# Patient Record
Sex: FEMALE | URBAN - METROPOLITAN AREA
[De-identification: names, ages, dates, MRNs, and addresses within clinical notes are randomized per-mention and may not be internally consistent; named-entity substitution may affect disease eponyms.]

---

## 2021-01-22 ENCOUNTER — HISTORICAL (OUTPATIENT)
Dept: ADMINISTRATIVE | Facility: HOSPITAL | Age: 66
End: 2021-01-22

## 2021-01-22 LAB — VALPROATE SERPL-MCNC: 57.4 UG/ML (ref 50–100)

## 2021-02-09 ENCOUNTER — HISTORICAL (OUTPATIENT)
Dept: ADMINISTRATIVE | Facility: HOSPITAL | Age: 66
End: 2021-02-09

## 2021-02-09 LAB
ABS NEUT (OLG): 3.84 X10(3)/MCL (ref 2.1–9.2)
ALBUMIN SERPL-MCNC: 3 GM/DL (ref 3.4–4.8)
ALBUMIN/GLOB SERPL: 0.8 RATIO (ref 1.1–2)
ALP SERPL-CCNC: 62 UNIT/L (ref 40–150)
ALT SERPL-CCNC: 7 UNIT/L (ref 0–55)
AST SERPL-CCNC: 11 UNIT/L (ref 5–34)
BASOPHILS # BLD AUTO: 0.02 X10(3)/MCL (ref 0–0.2)
BASOPHILS NFR BLD AUTO: 0.3 % (ref 0–1)
BILIRUB SERPL-MCNC: 0.3 MG/DL (ref 0.2–1.2)
BILIRUBIN DIRECT+TOT PNL SERPL-MCNC: 0.1 MG/DL (ref 0–0.8)
BILIRUBIN DIRECT+TOT PNL SERPL-MCNC: 0.2 MG/DL (ref 0–0.5)
BUN SERPL-MCNC: 25.7 MG/DL (ref 9.8–20.1)
CALCIUM SERPL-MCNC: 9.3 MG/DL (ref 8.4–10.2)
CHLORIDE SERPL-SCNC: 104 MMOL/L (ref 98–107)
CO2 SERPL-SCNC: 26 MMOL/L (ref 23–31)
CREAT SERPL-MCNC: 0.72 MG/DL (ref 0.57–1.11)
EOSINOPHIL # BLD AUTO: 0.28 X10(3)/MCL (ref 0–0.9)
EOSINOPHIL NFR BLD AUTO: 4.1 % (ref 0–6.4)
ERYTHROCYTE [DISTWIDTH] IN BLOOD BY AUTOMATED COUNT: 14.1 % (ref 11.5–17)
GLOBULIN SER-MCNC: 3.7 GM/DL (ref 2.4–3.5)
GLUCOSE SERPL-MCNC: 136 MG/DL (ref 82–115)
HCT VFR BLD AUTO: 42.6 % (ref 37–47)
HGB BLD-MCNC: 13.3 GM/DL (ref 12–16)
IMM GRANULOCYTES # BLD AUTO: 0.02 10*3/UL (ref 0–0.02)
IMM GRANULOCYTES NFR BLD AUTO: 0.3 % (ref 0–0.43)
LYMPHOCYTES # BLD AUTO: 2.24 X10(3)/MCL (ref 0.6–4.6)
LYMPHOCYTES NFR BLD AUTO: 33.1 % (ref 16–44)
MCH RBC QN AUTO: 26.5 PG (ref 27–31)
MCHC RBC AUTO-ENTMCNC: 31.2 GM/DL (ref 33–36)
MCV RBC AUTO: 84.9 FL (ref 80–94)
MONOCYTES # BLD AUTO: 0.36 X10(3)/MCL (ref 0.1–1.3)
MONOCYTES NFR BLD AUTO: 5.3 % (ref 4–12.1)
NEUTROPHILS # BLD AUTO: 3.84 X10(3)/MCL (ref 2.1–9.2)
NEUTROPHILS NFR BLD AUTO: 56.9 % (ref 43–73)
NRBC BLD AUTO-RTO: 0 % (ref 0–0.2)
PLATELET # BLD AUTO: 206 X10(3)/MCL (ref 130–400)
PMV BLD AUTO: 10.8 FL (ref 7.4–10.4)
POTASSIUM SERPL-SCNC: 3.8 MMOL/L (ref 3.5–5.1)
PROT SERPL-MCNC: 6.7 GM/DL (ref 5.8–7.6)
RBC # BLD AUTO: 5.02 X10(6)/MCL (ref 4.2–5.4)
SODIUM SERPL-SCNC: 142 MMOL/L (ref 136–145)
WBC # SPEC AUTO: 6.8 X10(3)/MCL (ref 4.5–11.5)

## 2021-03-22 ENCOUNTER — HISTORICAL (OUTPATIENT)
Dept: ADMINISTRATIVE | Facility: HOSPITAL | Age: 66
End: 2021-03-22

## 2021-03-22 LAB
ALBUMIN SERPL-MCNC: 3 GM/DL (ref 3.4–4.8)
ALBUMIN/GLOB SERPL: 1 RATIO (ref 1.1–2)
ALP SERPL-CCNC: 60 UNIT/L (ref 40–150)
ALT SERPL-CCNC: 7 UNIT/L (ref 0–55)
AST SERPL-CCNC: 8 UNIT/L (ref 5–34)
BILIRUB SERPL-MCNC: 0.2 MG/DL (ref 0.2–1.2)
BILIRUBIN DIRECT+TOT PNL SERPL-MCNC: 0.1 MG/DL (ref 0–0.5)
BILIRUBIN DIRECT+TOT PNL SERPL-MCNC: 0.1 MG/DL (ref 0–0.8)
BUN SERPL-MCNC: 33.3 MG/DL (ref 9.8–20.1)
CALCIUM SERPL-MCNC: 8.8 MG/DL (ref 8.4–10.2)
CHLORIDE SERPL-SCNC: 104 MMOL/L (ref 98–107)
CO2 SERPL-SCNC: 28 MMOL/L (ref 23–31)
CREAT SERPL-MCNC: 0.97 MG/DL (ref 0.57–1.11)
GLOBULIN SER-MCNC: 3.1 GM/DL (ref 2.4–3.5)
GLUCOSE SERPL-MCNC: 83 MG/DL (ref 82–115)
POTASSIUM SERPL-SCNC: 4.8 MMOL/L (ref 3.5–5.1)
PROT SERPL-MCNC: 6.1 GM/DL (ref 5.8–7.6)
SODIUM SERPL-SCNC: 141 MMOL/L (ref 136–145)

## 2021-05-28 ENCOUNTER — HISTORICAL (OUTPATIENT)
Dept: ADMINISTRATIVE | Facility: HOSPITAL | Age: 66
End: 2021-05-28

## 2021-05-28 LAB
APPEARANCE, UA: ABNORMAL
BACTERIA SPEC CULT: ABNORMAL /HPF
BILIRUB UR QL STRIP: NEGATIVE
COLOR UR: ABNORMAL
GLUCOSE (UA): NEGATIVE
HGB UR QL STRIP: ABNORMAL
KETONES UR QL STRIP: NEGATIVE
LEUKOCYTE ESTERASE UR QL STRIP: ABNORMAL
MUCOUS THREADS URNS QL MICRO: ABNORMAL /LPF
NITRITE UR QL STRIP: POSITIVE
PH UR STRIP: 6 [PH] (ref 5–7)
PROT UR QL STRIP: NEGATIVE
RBC #/AREA URNS HPF: 0 /[HPF]
SP GR UR STRIP: 1.01 (ref 1–1.03)
SQUAMOUS EPITHELIAL, UA: ABNORMAL /LPF
UROBILINOGEN UR STRIP-ACNC: NEGATIVE
WBC #/AREA URNS HPF: ABNORMAL /HPF

## 2021-06-03 ENCOUNTER — HISTORICAL (OUTPATIENT)
Dept: ADMINISTRATIVE | Facility: HOSPITAL | Age: 66
End: 2021-06-03

## 2021-06-03 LAB
ABS NEUT (OLG): 2.56 X10(3)/MCL (ref 2.1–9.2)
ALBUMIN SERPL-MCNC: 2.8 GM/DL (ref 3.4–4.8)
ALBUMIN/GLOB SERPL: 0.8 RATIO (ref 1.1–2)
ALP SERPL-CCNC: 54 UNIT/L (ref 40–150)
ALT SERPL-CCNC: 8 UNIT/L (ref 0–55)
AST SERPL-CCNC: 10 UNIT/L (ref 5–34)
BASOPHILS # BLD AUTO: 0.03 X10(3)/MCL (ref 0–0.2)
BASOPHILS NFR BLD AUTO: 0.5 % (ref 0–1)
BILIRUB SERPL-MCNC: 0.2 MG/DL (ref 0.2–1.2)
BILIRUBIN DIRECT+TOT PNL SERPL-MCNC: <0.1 MG/DL (ref 0–0.5)
BILIRUBIN DIRECT+TOT PNL SERPL-MCNC: >0.1 MG/DL (ref 0–0.8)
BUN SERPL-MCNC: 30.6 MG/DL (ref 9.8–20.1)
CALCIUM SERPL-MCNC: 9 MG/DL (ref 8.4–10.2)
CHLORIDE SERPL-SCNC: 104 MMOL/L (ref 98–107)
CHOLEST SERPL-MCNC: 117 MG/DL
CHOLEST/HDLC SERPL: 4 {RATIO} (ref 0–5)
CO2 SERPL-SCNC: 27 MMOL/L (ref 23–31)
CREAT SERPL-MCNC: 0.82 MG/DL (ref 0.57–1.11)
EOSINOPHIL # BLD AUTO: 0.32 X10(3)/MCL (ref 0–0.9)
EOSINOPHIL NFR BLD AUTO: 4.9 % (ref 0–6.4)
ERYTHROCYTE [DISTWIDTH] IN BLOOD BY AUTOMATED COUNT: 14.1 % (ref 11.5–17)
EST. AVERAGE GLUCOSE BLD GHB EST-MCNC: 111.2 MG/DL
GLOBULIN SER-MCNC: 3.3 GM/DL (ref 2.4–3.5)
GLUCOSE SERPL-MCNC: 117 MG/DL (ref 82–115)
HBA1C MFR BLD: 5.5 %
HCT VFR BLD AUTO: 39.9 % (ref 37–47)
HDLC SERPL-MCNC: 29 MG/DL (ref 40–60)
HGB BLD-MCNC: 12.3 GM/DL (ref 12–16)
IMM GRANULOCYTES # BLD AUTO: 0.02 10*3/UL (ref 0–0.02)
IMM GRANULOCYTES NFR BLD AUTO: 0.3 % (ref 0–0.43)
LDLC SERPL CALC-MCNC: 63 MG/DL (ref 50–140)
LYMPHOCYTES # BLD AUTO: 3.22 X10(3)/MCL (ref 0.6–4.6)
LYMPHOCYTES NFR BLD AUTO: 49.7 % (ref 16–44)
MCH RBC QN AUTO: 27 PG (ref 27–31)
MCHC RBC AUTO-ENTMCNC: 30.8 GM/DL (ref 33–36)
MCV RBC AUTO: 87.5 FL (ref 80–94)
MONOCYTES # BLD AUTO: 0.33 X10(3)/MCL (ref 0.1–1.3)
MONOCYTES NFR BLD AUTO: 5.1 % (ref 4–12.1)
NEUTROPHILS # BLD AUTO: 2.56 X10(3)/MCL (ref 2.1–9.2)
NEUTROPHILS NFR BLD AUTO: 39.5 % (ref 43–73)
NRBC BLD AUTO-RTO: 0 % (ref 0–0.2)
PLATELET # BLD AUTO: 193 X10(3)/MCL (ref 130–400)
PMV BLD AUTO: 10.7 FL (ref 7.4–10.4)
POTASSIUM SERPL-SCNC: 3.9 MMOL/L (ref 3.5–5.1)
PROT SERPL-MCNC: 6.1 GM/DL (ref 5.8–7.6)
RBC # BLD AUTO: 4.56 X10(6)/MCL (ref 4.2–5.4)
SODIUM SERPL-SCNC: 141 MMOL/L (ref 136–145)
TRIGL SERPL-MCNC: 124 MG/DL (ref 0–150)
VALPROATE SERPL-MCNC: 55.1 UG/ML (ref 50–100)
VLDLC SERPL CALC-MCNC: 25 MG/DL
WBC # SPEC AUTO: 6.5 X10(3)/MCL (ref 4.5–11.5)

## 2021-09-09 ENCOUNTER — HISTORICAL (OUTPATIENT)
Dept: ADMINISTRATIVE | Facility: HOSPITAL | Age: 66
End: 2021-09-09

## 2021-09-09 LAB
EST. AVERAGE GLUCOSE BLD GHB EST-MCNC: 114 MG/DL
HBA1C MFR BLD: 5.6 %
VALPROATE SERPL-MCNC: 41.3 UG/ML (ref 50–100)

## 2021-11-15 ENCOUNTER — HISTORICAL (OUTPATIENT)
Dept: ADMINISTRATIVE | Facility: HOSPITAL | Age: 66
End: 2021-11-15

## 2021-11-15 LAB
APPEARANCE, UA: CLEAR
BACTERIA SPEC CULT: ABNORMAL /HPF
BILIRUB UR QL STRIP: NEGATIVE
COLOR UR: YELLOW
GLUCOSE (UA): NEGATIVE
HGB UR QL STRIP: ABNORMAL
HYALINE CASTS #/AREA URNS LPF: ABNORMAL /LPF
KETONES UR QL STRIP: NEGATIVE
LEUKOCYTE ESTERASE UR QL STRIP: ABNORMAL
NITRITE UR QL STRIP: NEGATIVE
PH UR STRIP: 7 [PH] (ref 5–7)
PROT UR QL STRIP: NEGATIVE
RBC #/AREA URNS HPF: ABNORMAL /HPF
SP GR UR STRIP: 1.02 (ref 1–1.03)
SQUAMOUS EPITHELIAL, UA: ABNORMAL /LPF
UROBILINOGEN UR STRIP-ACNC: ABNORMAL
WBC #/AREA URNS HPF: ABNORMAL /HPF

## 2021-11-17 LAB — FINAL CULTURE: NORMAL

## 2021-12-14 ENCOUNTER — HISTORICAL (OUTPATIENT)
Dept: ADMINISTRATIVE | Facility: HOSPITAL | Age: 66
End: 2021-12-14

## 2021-12-14 LAB
ABS NEUT (OLG): 2.33 X10(3)/MCL (ref 2.1–9.2)
ALBUMIN SERPL-MCNC: 3 GM/DL (ref 3.4–4.8)
ALBUMIN/GLOB SERPL: 1 RATIO (ref 1.1–2)
ALP SERPL-CCNC: 51 UNIT/L (ref 40–150)
ALT SERPL-CCNC: 11 UNIT/L (ref 0–55)
AST SERPL-CCNC: 9 UNIT/L (ref 5–34)
BASOPHILS NFR BLD MANUAL: 0 % (ref 0–2)
BILIRUB SERPL-MCNC: 0.3 MG/DL (ref 0.2–1.2)
BILIRUBIN DIRECT+TOT PNL SERPL-MCNC: 0.1 MG/DL (ref 0–0.5)
BILIRUBIN DIRECT+TOT PNL SERPL-MCNC: 0.2 MG/DL (ref 0–0.8)
BUN SERPL-MCNC: 18.1 MG/DL (ref 9.8–20.1)
CALCIUM SERPL-MCNC: 9.3 MG/DL (ref 8.4–10.2)
CHLORIDE SERPL-SCNC: 105 MMOL/L (ref 98–107)
CHOLEST SERPL-MCNC: 124 MG/DL
CHOLEST/HDLC SERPL: 3 {RATIO} (ref 0–5)
CO2 SERPL-SCNC: 30 MMOL/L (ref 23–31)
CREAT SERPL-MCNC: 0.74 MG/DL (ref 0.57–1.11)
EOSINOPHIL NFR BLD MANUAL: 10 % (ref 0–8)
ERYTHROCYTE [DISTWIDTH] IN BLOOD BY AUTOMATED COUNT: 15 % (ref 11.5–17)
EST. AVERAGE GLUCOSE BLD GHB EST-MCNC: 122.6 MG/DL
GLOBULIN SER-MCNC: 3 GM/DL (ref 2.4–3.5)
GLUCOSE SERPL-MCNC: 105 MG/DL (ref 82–115)
GRANULOCYTES NFR BLD MANUAL: 36 % (ref 47–80)
HBA1C MFR BLD: 5.9 %
HCT VFR BLD AUTO: 45.6 % (ref 37–47)
HDLC SERPL-MCNC: 36 MG/DL (ref 40–60)
HGB BLD-MCNC: 14.2 GM/DL (ref 12–16)
HYPOCHROMIA BLD QL SMEAR: ABNORMAL
LDLC SERPL CALC-MCNC: 70 MG/DL (ref 50–140)
LYMPHOCYTES NFR BLD MANUAL: 1 %
LYMPHOCYTES NFR BLD MANUAL: 48 % (ref 13–40)
MCH RBC QN AUTO: 27 PG (ref 27–31)
MCHC RBC AUTO-ENTMCNC: 31.1 GM/DL (ref 33–36)
MCV RBC AUTO: 86.7 FL (ref 80–94)
MONOCYTES NFR BLD MANUAL: 5 % (ref 2–11)
PLATELET # BLD AUTO: 186 X10(3)/MCL (ref 130–400)
PLATELET # BLD EST: ADEQUATE 10*3/UL
PMV BLD AUTO: 11.3 FL (ref 7.4–10.4)
POTASSIUM SERPL-SCNC: 4.8 MMOL/L (ref 3.5–5.1)
PROT SERPL-MCNC: 6 GM/DL (ref 5.8–7.6)
RBC # BLD AUTO: 5.26 X10(6)/MCL (ref 4.2–5.4)
RBC MORPH BLD: ABNORMAL
SODIUM SERPL-SCNC: 144 MMOL/L (ref 136–145)
TRIGL SERPL-MCNC: 92 MG/DL (ref 0–150)
VALPROATE SERPL-MCNC: 65.4 UG/ML (ref 50–100)
VLDLC SERPL CALC-MCNC: 18 MG/DL
WBC # SPEC AUTO: 7 X10(3)/MCL (ref 4.5–11.5)

## 2022-06-05 ENCOUNTER — LAB REQUISITION (OUTPATIENT)
Dept: LAB | Facility: HOSPITAL | Age: 67
End: 2022-06-05

## 2022-06-05 DIAGNOSIS — E11.8 TYPE 2 DIABETES MELLITUS WITH UNSPECIFIED COMPLICATIONS: ICD-10-CM

## 2022-06-05 LAB
ALBUMIN SERPL-MCNC: 2.6 GM/DL (ref 3.4–4.8)
ALBUMIN/GLOB SERPL: 0.9 RATIO (ref 1.1–2)
ALP SERPL-CCNC: 48 UNIT/L (ref 40–150)
ALT SERPL-CCNC: 14 UNIT/L (ref 0–55)
AST SERPL-CCNC: 11 UNIT/L (ref 5–34)
BASOPHILS # BLD AUTO: 0.02 X10(3)/MCL (ref 0–0.2)
BASOPHILS NFR BLD AUTO: 0.4 %
BILIRUBIN DIRECT+TOT PNL SERPL-MCNC: 0.3 MG/DL
BUN SERPL-MCNC: 19.9 MG/DL (ref 9.8–20.1)
CALCIUM SERPL-MCNC: 8.9 MG/DL (ref 8.4–10.2)
CHLORIDE SERPL-SCNC: 104 MMOL/L (ref 98–107)
CHOLEST SERPL-MCNC: 112 MG/DL
CHOLEST/HDLC SERPL: 3 {RATIO} (ref 0–5)
CO2 SERPL-SCNC: 28 MMOL/L (ref 23–31)
CREAT SERPL-MCNC: 0.7 MG/DL (ref 0.55–1.02)
EOSINOPHIL # BLD AUTO: 0.27 X10(3)/MCL (ref 0–0.9)
EOSINOPHIL NFR BLD AUTO: 5.3 %
ERYTHROCYTE [DISTWIDTH] IN BLOOD BY AUTOMATED COUNT: 14.5 % (ref 11.5–17)
EST. AVERAGE GLUCOSE BLD GHB EST-MCNC: 131.2 MG/DL
GLOBULIN SER-MCNC: 2.8 GM/DL (ref 2.4–3.5)
GLUCOSE SERPL-MCNC: 199 MG/DL (ref 82–115)
HBA1C MFR BLD: 6.2 %
HCT VFR BLD AUTO: 39.4 % (ref 37–47)
HDLC SERPL-MCNC: 33 MG/DL (ref 35–60)
HGB BLD-MCNC: 12.4 GM/DL (ref 12–16)
IMM GRANULOCYTES # BLD AUTO: 0 X10(3)/MCL (ref 0–0.02)
IMM GRANULOCYTES NFR BLD AUTO: 0 % (ref 0–0.43)
LDLC SERPL CALC-MCNC: 59 MG/DL (ref 50–140)
LYMPHOCYTES # BLD AUTO: 2.04 X10(3)/MCL (ref 0.6–4.6)
LYMPHOCYTES NFR BLD AUTO: 40.2 %
MCH RBC QN AUTO: 27.1 PG (ref 27–31)
MCHC RBC AUTO-ENTMCNC: 31.5 MG/DL (ref 33–36)
MCV RBC AUTO: 86 FL (ref 80–94)
MONOCYTES # BLD AUTO: 0.25 X10(3)/MCL (ref 0.1–1.3)
MONOCYTES NFR BLD AUTO: 4.9 %
NEUTROPHILS # BLD AUTO: 2.5 X10(3)/MCL (ref 2.1–9.2)
NEUTROPHILS NFR BLD AUTO: 49.2 %
NRBC BLD AUTO-RTO: 0 %
PLATELET # BLD AUTO: 157 X10(3)/MCL (ref 130–400)
PMV BLD AUTO: 11.3 FL (ref 9.4–12.4)
POTASSIUM SERPL-SCNC: 4.3 MMOL/L (ref 3.5–5.1)
PREALB SERPL-MCNC: 22.9 MG/DL (ref 14–37)
PROT SERPL-MCNC: 5.4 GM/DL (ref 5.8–7.6)
RBC # BLD AUTO: 4.58 X10(6)/MCL (ref 4.2–5.4)
SODIUM SERPL-SCNC: 142 MMOL/L (ref 136–145)
TRIGL SERPL-MCNC: 98 MG/DL (ref 37–140)
VLDLC SERPL CALC-MCNC: 20 MG/DL
WBC # SPEC AUTO: 5.1 X10(3)/MCL (ref 4.5–11.5)

## 2022-06-05 PROCEDURE — 80061 LIPID PANEL: CPT | Performed by: FAMILY MEDICINE

## 2022-06-05 PROCEDURE — 80053 COMPREHEN METABOLIC PANEL: CPT | Performed by: FAMILY MEDICINE

## 2022-06-05 PROCEDURE — 84134 ASSAY OF PREALBUMIN: CPT | Performed by: FAMILY MEDICINE

## 2022-06-05 PROCEDURE — 85025 COMPLETE CBC W/AUTO DIFF WBC: CPT | Performed by: FAMILY MEDICINE

## 2022-06-05 PROCEDURE — 83036 HEMOGLOBIN GLYCOSYLATED A1C: CPT | Performed by: FAMILY MEDICINE

## 2022-09-01 ENCOUNTER — LAB REQUISITION (OUTPATIENT)
Dept: LAB | Facility: HOSPITAL | Age: 67
End: 2022-09-01
Payer: MEDICARE

## 2022-09-01 DIAGNOSIS — F31.9 BIPOLAR DISORDER, UNSPECIFIED: ICD-10-CM

## 2022-09-01 DIAGNOSIS — E11.8 TYPE 2 DIABETES MELLITUS WITH UNSPECIFIED COMPLICATIONS: ICD-10-CM

## 2022-09-01 LAB
EST. AVERAGE GLUCOSE BLD GHB EST-MCNC: 119.8 MG/DL
HBA1C MFR BLD: 5.8 %
VALPROATE SERPL-MCNC: 56.8 UG/ML (ref 50–100)

## 2022-09-01 PROCEDURE — 80164 ASSAY DIPROPYLACETIC ACD TOT: CPT | Performed by: INTERNAL MEDICINE

## 2022-09-01 PROCEDURE — 83036 HEMOGLOBIN GLYCOSYLATED A1C: CPT | Performed by: INTERNAL MEDICINE

## 2022-09-14 PROCEDURE — 81001 URINALYSIS AUTO W/SCOPE: CPT | Performed by: NURSE PRACTITIONER

## 2022-09-14 PROCEDURE — 87077 CULTURE AEROBIC IDENTIFY: CPT | Mod: 59 | Performed by: NURSE PRACTITIONER

## 2022-09-15 ENCOUNTER — LAB REQUISITION (OUTPATIENT)
Dept: LAB | Facility: HOSPITAL | Age: 67
End: 2022-09-15
Payer: MEDICARE

## 2022-09-15 DIAGNOSIS — N39.0 URINARY TRACT INFECTION, SITE NOT SPECIFIED: ICD-10-CM

## 2022-09-15 LAB
AMORPH URATE CRY URNS QL MICRO: ABNORMAL /HPF
APPEARANCE UR: ABNORMAL
BACTERIA #/AREA URNS AUTO: ABNORMAL /HPF
BILIRUB UR QL STRIP.AUTO: NEGATIVE MG/DL
CAOX CRY URNS QL MICRO: ABNORMAL /HPF
COLOR UR AUTO: YELLOW
GLUCOSE UR QL STRIP.AUTO: NEGATIVE MG/DL
KETONES UR QL STRIP.AUTO: ABNORMAL MG/DL
LEUKOCYTE ESTERASE UR QL STRIP.AUTO: ABNORMAL UNIT/L
NITRITE UR QL STRIP.AUTO: NEGATIVE
PH UR STRIP.AUTO: 5.5 [PH]
PROT UR QL STRIP.AUTO: NEGATIVE MG/DL
RBC #/AREA URNS AUTO: ABNORMAL /HPF
RBC UR QL AUTO: ABNORMAL UNIT/L
SP GR UR STRIP.AUTO: >=1.03
SQUAMOUS #/AREA URNS AUTO: ABNORMAL /HPF
UROBILINOGEN UR STRIP-ACNC: 0.2 MG/DL
WBC #/AREA URNS AUTO: ABNORMAL /HPF

## 2022-09-17 ENCOUNTER — LAB REQUISITION (OUTPATIENT)
Dept: LAB | Facility: HOSPITAL | Age: 67
End: 2022-09-17
Payer: MEDICARE

## 2022-09-17 DIAGNOSIS — Z11.2 ENCOUNTER FOR SCREENING FOR OTHER BACTERIAL DISEASES: ICD-10-CM

## 2022-09-17 LAB
APPEARANCE UR: ABNORMAL
BACTERIA #/AREA URNS AUTO: ABNORMAL /HPF
BACTERIA UR CULT: ABNORMAL
BACTERIA UR CULT: ABNORMAL
BILIRUB UR QL STRIP.AUTO: NEGATIVE MG/DL
COLOR UR AUTO: YELLOW
GLUCOSE UR QL STRIP.AUTO: NEGATIVE MG/DL
KETONES UR QL STRIP.AUTO: ABNORMAL MG/DL
LEUKOCYTE ESTERASE UR QL STRIP.AUTO: ABNORMAL UNIT/L
NITRITE UR QL STRIP.AUTO: NEGATIVE
PH UR STRIP.AUTO: 5.5 [PH]
PROT UR QL STRIP.AUTO: 100 MG/DL
RBC #/AREA URNS AUTO: ABNORMAL /HPF
RBC UR QL AUTO: ABNORMAL UNIT/L
SP GR UR STRIP.AUTO: >=1.03
SQUAMOUS #/AREA URNS AUTO: ABNORMAL /HPF
UROBILINOGEN UR STRIP-ACNC: 0.2 MG/DL
WBC #/AREA URNS AUTO: ABNORMAL /HPF

## 2022-09-17 PROCEDURE — 87088 URINE BACTERIA CULTURE: CPT | Performed by: INTERNAL MEDICINE

## 2022-09-17 PROCEDURE — 81001 URINALYSIS AUTO W/SCOPE: CPT | Performed by: INTERNAL MEDICINE

## 2022-09-20 LAB — BACTERIA UR CULT: ABNORMAL

## 2022-10-22 ENCOUNTER — LAB REQUISITION (OUTPATIENT)
Dept: LAB | Facility: HOSPITAL | Age: 67
End: 2022-10-22
Payer: MEDICARE

## 2022-10-22 DIAGNOSIS — I48.0 PAROXYSMAL ATRIAL FIBRILLATION: ICD-10-CM

## 2022-10-22 LAB
ALBUMIN SERPL-MCNC: 2.7 GM/DL (ref 3.4–4.8)
ALBUMIN/GLOB SERPL: 1 RATIO (ref 1.1–2)
ALP SERPL-CCNC: 43 UNIT/L (ref 40–150)
ALT SERPL-CCNC: 10 UNIT/L (ref 0–55)
AST SERPL-CCNC: 11 UNIT/L (ref 5–34)
BASOPHILS # BLD AUTO: 0.02 X10(3)/MCL (ref 0–0.2)
BASOPHILS NFR BLD AUTO: 0.3 %
BILIRUBIN DIRECT+TOT PNL SERPL-MCNC: 0.4 MG/DL
BUN SERPL-MCNC: 17.5 MG/DL (ref 9.8–20.1)
CALCIUM SERPL-MCNC: 9.2 MG/DL (ref 8.4–10.2)
CHLORIDE SERPL-SCNC: 107 MMOL/L (ref 98–107)
CO2 SERPL-SCNC: 28 MMOL/L (ref 23–31)
CREAT SERPL-MCNC: 0.56 MG/DL (ref 0.55–1.02)
EOSINOPHIL # BLD AUTO: 0.22 X10(3)/MCL (ref 0–0.9)
EOSINOPHIL NFR BLD AUTO: 3.5 %
ERYTHROCYTE [DISTWIDTH] IN BLOOD BY AUTOMATED COUNT: 14.4 % (ref 11.5–17)
GFR SERPLBLD CREATININE-BSD FMLA CKD-EPI: >60 MLS/MIN/1.73/M2
GLOBULIN SER-MCNC: 2.8 GM/DL (ref 2.4–3.5)
GLUCOSE SERPL-MCNC: 97 MG/DL (ref 82–115)
HCT VFR BLD AUTO: 44 % (ref 37–47)
HGB BLD-MCNC: 14.2 GM/DL (ref 12–16)
IMM GRANULOCYTES # BLD AUTO: 0.01 X10(3)/MCL (ref 0–0.04)
IMM GRANULOCYTES NFR BLD AUTO: 0.2 %
LYMPHOCYTES # BLD AUTO: 2.55 X10(3)/MCL (ref 0.6–4.6)
LYMPHOCYTES NFR BLD AUTO: 40.1 %
MCH RBC QN AUTO: 28.6 PG (ref 27–31)
MCHC RBC AUTO-ENTMCNC: 32.3 MG/DL (ref 33–36)
MCV RBC AUTO: 88.7 FL (ref 80–94)
MONOCYTES # BLD AUTO: 0.44 X10(3)/MCL (ref 0.1–1.3)
MONOCYTES NFR BLD AUTO: 6.9 %
NEUTROPHILS # BLD AUTO: 3.1 X10(3)/MCL (ref 2.1–9.2)
NEUTROPHILS NFR BLD AUTO: 49 %
NRBC BLD AUTO-RTO: 0 %
PLATELET # BLD AUTO: 153 X10(3)/MCL (ref 130–400)
PMV BLD AUTO: 11.1 FL (ref 7.4–10.4)
POTASSIUM SERPL-SCNC: 4.9 MMOL/L (ref 3.5–5.1)
PROT SERPL-MCNC: 5.5 GM/DL (ref 5.8–7.6)
RBC # BLD AUTO: 4.96 X10(6)/MCL (ref 4.2–5.4)
SODIUM SERPL-SCNC: 144 MMOL/L (ref 136–145)
WBC # SPEC AUTO: 6.4 X10(3)/MCL (ref 4.5–11.5)

## 2022-10-22 PROCEDURE — 85025 COMPLETE CBC W/AUTO DIFF WBC: CPT

## 2022-10-22 PROCEDURE — 80053 COMPREHEN METABOLIC PANEL: CPT

## 2022-11-04 PROCEDURE — 81003 URINALYSIS AUTO W/O SCOPE: CPT | Performed by: NURSE PRACTITIONER

## 2022-11-04 PROCEDURE — 87088 URINE BACTERIA CULTURE: CPT | Performed by: NURSE PRACTITIONER

## 2022-11-05 ENCOUNTER — LAB REQUISITION (OUTPATIENT)
Dept: LAB | Facility: HOSPITAL | Age: 67
End: 2022-11-05
Payer: MEDICARE

## 2022-11-05 DIAGNOSIS — R30.0 DYSURIA: ICD-10-CM

## 2022-11-05 LAB
APPEARANCE UR: CLEAR
BILIRUB UR QL STRIP.AUTO: NEGATIVE MG/DL
COLOR UR AUTO: YELLOW
GLUCOSE UR QL STRIP.AUTO: NEGATIVE MG/DL
KETONES UR QL STRIP.AUTO: ABNORMAL MG/DL
LEUKOCYTE ESTERASE UR QL STRIP.AUTO: NEGATIVE UNIT/L
NITRITE UR QL STRIP.AUTO: NEGATIVE
PH UR STRIP.AUTO: 5.5 [PH]
PROT UR QL STRIP.AUTO: NEGATIVE MG/DL
RBC UR QL AUTO: NEGATIVE UNIT/L
SP GR UR STRIP.AUTO: >=1.03
UROBILINOGEN UR STRIP-ACNC: 0.2 MG/DL

## 2022-11-07 LAB — BACTERIA UR CULT: NO GROWTH

## 2022-11-16 ENCOUNTER — LAB REQUISITION (OUTPATIENT)
Dept: LAB | Facility: HOSPITAL | Age: 67
End: 2022-11-16
Payer: MEDICARE

## 2022-11-16 DIAGNOSIS — Z29.9 ENCOUNTER FOR PROPHYLACTIC MEASURES, UNSPECIFIED: ICD-10-CM

## 2022-11-16 LAB — VALPROATE SERPL-MCNC: 47.9 UG/ML (ref 50–100)

## 2022-11-16 PROCEDURE — 80164 ASSAY DIPROPYLACETIC ACD TOT: CPT | Performed by: INTERNAL MEDICINE

## 2022-12-02 ENCOUNTER — LAB REQUISITION (OUTPATIENT)
Dept: LAB | Facility: HOSPITAL | Age: 67
End: 2022-12-02
Payer: MEDICARE

## 2022-12-02 DIAGNOSIS — I10 ESSENTIAL (PRIMARY) HYPERTENSION: ICD-10-CM

## 2022-12-02 DIAGNOSIS — E11.8 TYPE 2 DIABETES MELLITUS WITH UNSPECIFIED COMPLICATIONS: ICD-10-CM

## 2022-12-02 DIAGNOSIS — E78.5 HYPERLIPIDEMIA, UNSPECIFIED: ICD-10-CM

## 2022-12-02 LAB
ALBUMIN SERPL-MCNC: 2.7 GM/DL (ref 3.4–4.8)
ALBUMIN/GLOB SERPL: 1 RATIO (ref 1.1–2)
ALP SERPL-CCNC: 39 UNIT/L (ref 40–150)
ALT SERPL-CCNC: 20 UNIT/L (ref 0–55)
AST SERPL-CCNC: 16 UNIT/L (ref 5–34)
BILIRUBIN DIRECT+TOT PNL SERPL-MCNC: 0.5 MG/DL
BUN SERPL-MCNC: 23.3 MG/DL (ref 9.8–20.1)
CALCIUM SERPL-MCNC: 9.1 MG/DL (ref 8.4–10.2)
CHLORIDE SERPL-SCNC: 106 MMOL/L (ref 98–107)
CHOLEST SERPL-MCNC: 99 MG/DL
CHOLEST/HDLC SERPL: 3 {RATIO} (ref 0–5)
CO2 SERPL-SCNC: 29 MMOL/L (ref 23–31)
CREAT SERPL-MCNC: 0.52 MG/DL (ref 0.55–1.02)
ERYTHROCYTE [DISTWIDTH] IN BLOOD BY AUTOMATED COUNT: 14.3 % (ref 11.5–17)
GFR SERPLBLD CREATININE-BSD FMLA CKD-EPI: >60 MLS/MIN/1.73/M2
GLOBULIN SER-MCNC: 2.7 GM/DL (ref 2.4–3.5)
GLUCOSE SERPL-MCNC: 74 MG/DL (ref 82–115)
HCT VFR BLD AUTO: 44.7 % (ref 37–47)
HDLC SERPL-MCNC: 30 MG/DL (ref 35–60)
HGB BLD-MCNC: 14.5 GM/DL (ref 12–16)
LDLC SERPL CALC-MCNC: 50 MG/DL (ref 50–140)
MCH RBC QN AUTO: 28.6 PG (ref 27–31)
MCHC RBC AUTO-ENTMCNC: 32.4 MG/DL (ref 33–36)
MCV RBC AUTO: 88.2 FL (ref 80–94)
NRBC BLD AUTO-RTO: 0 %
PLATELET # BLD AUTO: 144 X10(3)/MCL (ref 130–400)
PMV BLD AUTO: 11.4 FL (ref 7.4–10.4)
POTASSIUM SERPL-SCNC: 4.2 MMOL/L (ref 3.5–5.1)
PROT SERPL-MCNC: 5.4 GM/DL (ref 5.8–7.6)
RBC # BLD AUTO: 5.07 X10(6)/MCL (ref 4.2–5.4)
SODIUM SERPL-SCNC: 143 MMOL/L (ref 136–145)
TRIGL SERPL-MCNC: 96 MG/DL (ref 37–140)
VLDLC SERPL CALC-MCNC: 19 MG/DL
WBC # SPEC AUTO: 6.4 X10(3)/MCL (ref 4.5–11.5)

## 2022-12-02 PROCEDURE — 85027 COMPLETE CBC AUTOMATED: CPT | Performed by: INTERNAL MEDICINE

## 2022-12-02 PROCEDURE — 80053 COMPREHEN METABOLIC PANEL: CPT | Performed by: INTERNAL MEDICINE

## 2022-12-02 PROCEDURE — 80061 LIPID PANEL: CPT | Performed by: INTERNAL MEDICINE

## 2022-12-29 ENCOUNTER — LAB REQUISITION (OUTPATIENT)
Dept: LAB | Facility: HOSPITAL | Age: 67
End: 2022-12-29
Payer: MEDICARE

## 2022-12-29 DIAGNOSIS — R41.0 DISORIENTATION, UNSPECIFIED: ICD-10-CM

## 2022-12-29 LAB
ANION GAP SERPL CALC-SCNC: 10 MEQ/L
APPEARANCE UR: CLEAR
BACTERIA #/AREA URNS AUTO: ABNORMAL /HPF
BASOPHILS # BLD AUTO: 0.02 X10(3)/MCL (ref 0–0.2)
BASOPHILS NFR BLD AUTO: 0.3 %
BILIRUB UR QL STRIP.AUTO: NEGATIVE MG/DL
BUN SERPL-MCNC: 13.8 MG/DL (ref 9.8–20.1)
CALCIUM SERPL-MCNC: 9.3 MG/DL (ref 8.4–10.2)
CHLORIDE SERPL-SCNC: 104 MMOL/L (ref 98–107)
CO2 SERPL-SCNC: 27 MMOL/L (ref 23–31)
COLOR UR AUTO: YELLOW
CREAT SERPL-MCNC: 0.65 MG/DL (ref 0.55–1.02)
CREAT/UREA NIT SERPL: 21
EOSINOPHIL # BLD AUTO: 0.18 X10(3)/MCL (ref 0–0.9)
EOSINOPHIL NFR BLD AUTO: 2.6 %
ERYTHROCYTE [DISTWIDTH] IN BLOOD BY AUTOMATED COUNT: 13.7 % (ref 11–14.5)
GFR SERPLBLD CREATININE-BSD FMLA CKD-EPI: >60 MLS/MIN/1.73/M2
GLUCOSE SERPL-MCNC: 118 MG/DL (ref 82–115)
GLUCOSE UR QL STRIP.AUTO: NEGATIVE MG/DL
HCT VFR BLD AUTO: 44.5 % (ref 37–47)
HGB BLD-MCNC: 14.1 GM/DL (ref 12–16)
IMM GRANULOCYTES # BLD AUTO: 0.01 X10(3)/MCL (ref 0–0.04)
IMM GRANULOCYTES NFR BLD AUTO: 0.1 %
KETONES UR QL STRIP.AUTO: NEGATIVE MG/DL
LEUKOCYTE ESTERASE UR QL STRIP.AUTO: NEGATIVE UNIT/L
LYMPHOCYTES # BLD AUTO: 2.73 X10(3)/MCL (ref 0.6–4.6)
LYMPHOCYTES NFR BLD AUTO: 39.7 %
MCH RBC QN AUTO: 28.7 PG
MCHC RBC AUTO-ENTMCNC: 31.7 MG/DL (ref 33–36)
MCV RBC AUTO: 90.6 FL (ref 80–94)
MONOCYTES # BLD AUTO: 0.52 X10(3)/MCL (ref 0.1–1.3)
MONOCYTES NFR BLD AUTO: 7.6 %
NEUTROPHILS # BLD AUTO: 3.41 X10(3)/MCL (ref 2.1–9.2)
NEUTROPHILS NFR BLD AUTO: 49.7 %
NITRITE UR QL STRIP.AUTO: NEGATIVE
NRBC BLD AUTO-RTO: 0 % (ref 0–1)
PH UR STRIP.AUTO: 6 [PH]
PLATELET # BLD AUTO: 193 X10(3)/MCL (ref 140–371)
PMV BLD AUTO: 10.2 FL (ref 9.4–12.4)
POTASSIUM SERPL-SCNC: 4.6 MMOL/L (ref 3.5–5.1)
PROT UR QL STRIP.AUTO: NEGATIVE MG/DL
RBC # BLD AUTO: 4.91 X10(6)/MCL (ref 4.2–5.4)
RBC #/AREA URNS AUTO: ABNORMAL /HPF
RBC UR QL AUTO: ABNORMAL UNIT/L
SODIUM SERPL-SCNC: 141 MMOL/L (ref 136–145)
SP GR UR STRIP.AUTO: 1.02
SQUAMOUS #/AREA URNS AUTO: ABNORMAL /HPF
UROBILINOGEN UR STRIP-ACNC: 0.2 MG/DL
WBC # SPEC AUTO: 6.9 X10(3)/MCL (ref 4.5–11.5)
WBC #/AREA URNS AUTO: ABNORMAL /HPF

## 2022-12-29 PROCEDURE — 85025 COMPLETE CBC W/AUTO DIFF WBC: CPT | Performed by: NURSE PRACTITIONER

## 2022-12-29 PROCEDURE — 80048 BASIC METABOLIC PNL TOTAL CA: CPT | Performed by: NURSE PRACTITIONER

## 2022-12-29 PROCEDURE — 81003 URINALYSIS AUTO W/O SCOPE: CPT | Performed by: NURSE PRACTITIONER

## 2023-09-26 ENCOUNTER — HOSPITAL ENCOUNTER (INPATIENT)
Facility: HOSPITAL | Age: 68
LOS: 2 days | Discharge: HOME | DRG: 394 | End: 2023-09-28
Attending: STUDENT IN AN ORGANIZED HEALTH CARE EDUCATION/TRAINING PROGRAM | Admitting: INTERNAL MEDICINE
Payer: MEDICARE

## 2023-09-26 DIAGNOSIS — E44.0 MODERATE MALNUTRITION: Primary | ICD-10-CM

## 2023-09-26 DIAGNOSIS — R07.9 CHEST PAIN: ICD-10-CM

## 2023-09-26 DIAGNOSIS — K94.23 PEG TUBE MALFUNCTION: ICD-10-CM

## 2023-09-26 LAB
ALBUMIN SERPL-MCNC: 3.3 G/DL (ref 3.4–4.8)
ALBUMIN/GLOB SERPL: 0.7 RATIO (ref 1.1–2)
ALP SERPL-CCNC: 99 UNIT/L (ref 40–150)
ALT SERPL-CCNC: 21 UNIT/L (ref 0–55)
AST SERPL-CCNC: 25 UNIT/L (ref 5–34)
BASOPHILS # BLD AUTO: 0.04 X10(3)/MCL
BASOPHILS NFR BLD AUTO: 0.4 %
BILIRUB SERPL-MCNC: 0.6 MG/DL
BUN SERPL-MCNC: 39.7 MG/DL (ref 9.8–20.1)
CALCIUM SERPL-MCNC: 10 MG/DL (ref 8.4–10.2)
CHLORIDE SERPL-SCNC: 103 MMOL/L (ref 98–107)
CO2 SERPL-SCNC: 20 MMOL/L (ref 23–31)
CREAT SERPL-MCNC: 1.07 MG/DL (ref 0.55–1.02)
EOSINOPHIL # BLD AUTO: 0.3 X10(3)/MCL (ref 0–0.9)
EOSINOPHIL NFR BLD AUTO: 3.4 %
ERYTHROCYTE [DISTWIDTH] IN BLOOD BY AUTOMATED COUNT: 16.8 % (ref 11.5–17)
GFR SERPLBLD CREATININE-BSD FMLA CKD-EPI: 57 MLS/MIN/1.73/M2
GLOBULIN SER-MCNC: 4.6 GM/DL (ref 2.4–3.5)
GLUCOSE SERPL-MCNC: 139 MG/DL (ref 82–115)
HCT VFR BLD AUTO: 38.2 % (ref 37–47)
HGB BLD-MCNC: 12.2 G/DL (ref 12–16)
IMM GRANULOCYTES # BLD AUTO: 0.04 X10(3)/MCL (ref 0–0.04)
IMM GRANULOCYTES NFR BLD AUTO: 0.4 %
LYMPHOCYTES # BLD AUTO: 2.39 X10(3)/MCL (ref 0.6–4.6)
LYMPHOCYTES NFR BLD AUTO: 26.8 %
MCH RBC QN AUTO: 25.7 PG (ref 27–31)
MCHC RBC AUTO-ENTMCNC: 31.9 G/DL (ref 33–36)
MCV RBC AUTO: 80.4 FL (ref 80–94)
MONOCYTES # BLD AUTO: 0.5 X10(3)/MCL (ref 0.1–1.3)
MONOCYTES NFR BLD AUTO: 5.6 %
NEUTROPHILS # BLD AUTO: 5.66 X10(3)/MCL (ref 2.1–9.2)
NEUTROPHILS NFR BLD AUTO: 63.4 %
NRBC BLD AUTO-RTO: 0 %
PLATELET # BLD AUTO: 334 X10(3)/MCL (ref 130–400)
PMV BLD AUTO: 12.3 FL (ref 7.4–10.4)
POCT GLUCOSE: 108 MG/DL (ref 70–110)
POCT GLUCOSE: 130 MG/DL (ref 70–110)
POCT GLUCOSE: 132 MG/DL (ref 70–110)
POTASSIUM SERPL-SCNC: 4 MMOL/L (ref 3.5–5.1)
PROT SERPL-MCNC: 7.9 GM/DL (ref 5.8–7.6)
RBC # BLD AUTO: 4.75 X10(6)/MCL (ref 4.2–5.4)
SODIUM SERPL-SCNC: 142 MMOL/L (ref 136–145)
WBC # SPEC AUTO: 8.93 X10(3)/MCL (ref 4.5–11.5)

## 2023-09-26 PROCEDURE — 63600175 PHARM REV CODE 636 W HCPCS: Performed by: NURSE PRACTITIONER

## 2023-09-26 PROCEDURE — 99285 EMERGENCY DEPT VISIT HI MDM: CPT

## 2023-09-26 PROCEDURE — 99900035 HC TECH TIME PER 15 MIN (STAT)

## 2023-09-26 PROCEDURE — 63600175 PHARM REV CODE 636 W HCPCS: Performed by: STUDENT IN AN ORGANIZED HEALTH CARE EDUCATION/TRAINING PROGRAM

## 2023-09-26 PROCEDURE — 85025 COMPLETE CBC W/AUTO DIFF WBC: CPT | Performed by: STUDENT IN AN ORGANIZED HEALTH CARE EDUCATION/TRAINING PROGRAM

## 2023-09-26 PROCEDURE — 21400001 HC TELEMETRY ROOM

## 2023-09-26 PROCEDURE — 11000001 HC ACUTE MED/SURG PRIVATE ROOM

## 2023-09-26 PROCEDURE — 25500020 PHARM REV CODE 255: Performed by: INTERNAL MEDICINE

## 2023-09-26 PROCEDURE — 80053 COMPREHEN METABOLIC PANEL: CPT | Performed by: STUDENT IN AN ORGANIZED HEALTH CARE EDUCATION/TRAINING PROGRAM

## 2023-09-26 PROCEDURE — 99232 SBSQ HOSP IP/OBS MODERATE 35: CPT | Mod: GC,,, | Performed by: SURGERY

## 2023-09-26 PROCEDURE — 27000221 HC OXYGEN, UP TO 24 HOURS

## 2023-09-26 PROCEDURE — 94761 N-INVAS EAR/PLS OXIMETRY MLT: CPT

## 2023-09-26 PROCEDURE — 99232 PR SUBSEQUENT HOSPITAL CARE,LEVL II: ICD-10-PCS | Mod: GC,,, | Performed by: SURGERY

## 2023-09-26 RX ORDER — ONDANSETRON 2 MG/ML
4 INJECTION INTRAMUSCULAR; INTRAVENOUS EVERY 4 HOURS PRN
Status: DISCONTINUED | OUTPATIENT
Start: 2023-09-26 | End: 2023-09-28 | Stop reason: HOSPADM

## 2023-09-26 RX ORDER — GLUCAGON 1 MG
1 KIT INJECTION
Status: DISCONTINUED | OUTPATIENT
Start: 2023-09-26 | End: 2023-09-28 | Stop reason: HOSPADM

## 2023-09-26 RX ORDER — NALOXONE HCL 0.4 MG/ML
0.02 VIAL (ML) INJECTION
Status: DISCONTINUED | OUTPATIENT
Start: 2023-09-26 | End: 2023-09-28 | Stop reason: HOSPADM

## 2023-09-26 RX ORDER — INSULIN ASPART 100 [IU]/ML
0-10 INJECTION, SOLUTION INTRAVENOUS; SUBCUTANEOUS EVERY 6 HOURS PRN
Status: DISCONTINUED | OUTPATIENT
Start: 2023-09-26 | End: 2023-09-28 | Stop reason: HOSPADM

## 2023-09-26 RX ORDER — SODIUM CHLORIDE 0.9 % (FLUSH) 0.9 %
10 SYRINGE (ML) INJECTION
Status: DISCONTINUED | OUTPATIENT
Start: 2023-09-26 | End: 2023-09-28 | Stop reason: HOSPADM

## 2023-09-26 RX ORDER — PROCHLORPERAZINE EDISYLATE 5 MG/ML
5 INJECTION INTRAMUSCULAR; INTRAVENOUS EVERY 6 HOURS PRN
Status: DISCONTINUED | OUTPATIENT
Start: 2023-09-26 | End: 2023-09-28 | Stop reason: HOSPADM

## 2023-09-26 RX ORDER — SODIUM CHLORIDE, SODIUM LACTATE, POTASSIUM CHLORIDE, CALCIUM CHLORIDE 600; 310; 30; 20 MG/100ML; MG/100ML; MG/100ML; MG/100ML
INJECTION, SOLUTION INTRAVENOUS CONTINUOUS
Status: DISCONTINUED | OUTPATIENT
Start: 2023-09-26 | End: 2023-09-27

## 2023-09-26 RX ORDER — ENOXAPARIN SODIUM 100 MG/ML
40 INJECTION SUBCUTANEOUS EVERY 24 HOURS
Status: DISCONTINUED | OUTPATIENT
Start: 2023-09-26 | End: 2023-09-28 | Stop reason: HOSPADM

## 2023-09-26 RX ORDER — ACETAMINOPHEN 650 MG/1
650 SUPPOSITORY RECTAL EVERY 4 HOURS PRN
Status: DISCONTINUED | OUTPATIENT
Start: 2023-09-26 | End: 2023-09-28 | Stop reason: HOSPADM

## 2023-09-26 RX ADMIN — DIATRIZOATE MEGLUMINE AND DIATRIZOATE SODIUM 30 ML: 660; 100 LIQUID ORAL; RECTAL at 08:09

## 2023-09-26 RX ADMIN — DIATRIZOATE MEGLUMINE AND DIATRIZOATE SODIUM 30 ML: 660; 100 LIQUID ORAL; RECTAL at 01:09

## 2023-09-26 RX ADMIN — SODIUM CHLORIDE, POTASSIUM CHLORIDE, SODIUM LACTATE AND CALCIUM CHLORIDE 1000 ML: 600; 310; 30; 20 INJECTION, SOLUTION INTRAVENOUS at 05:09

## 2023-09-26 RX ADMIN — SODIUM CHLORIDE, POTASSIUM CHLORIDE, SODIUM LACTATE AND CALCIUM CHLORIDE: 600; 310; 30; 20 INJECTION, SOLUTION INTRAVENOUS at 11:09

## 2023-09-26 RX ADMIN — ENOXAPARIN SODIUM 40 MG: 40 INJECTION SUBCUTANEOUS at 05:09

## 2023-09-26 NOTE — ED PROVIDER NOTES
"Encounter Date: 9/26/2023    SCRIBE #1 NOTE: I, Aly Chavez, am scribing for, and in the presence of,  Abilio Hall MD. I have scribed the following portions of the note - Other sections scribed: HPI, ROS, PE.       History     Chief Complaint   Patient presents with    PEG tube problem     PEG tube "fell out" yesterday at unknown time per nurse. From [facility] at [facility]      68 year old female with past medical history of type 2 diabetes, schizophrenia, hypertension, AFib, tracheostomy placed in January of this year after admission for sepsis, PEG tube dependent presents to ED from nursing home for PEG tube problem. Pt reports that peg tube fell out at unknown time yesterday.  Unclear exactly what time the PEG tube fell out.    The history is provided by the patient. No  was used.   GI Problem  The other symptoms of the illness do not include fever, shortness of breath or dysuria.     Review of patient's allergies indicates:  Not on File  History reviewed. No pertinent past medical history.  History reviewed. No pertinent surgical history.  History reviewed. No pertinent family history.     Review of Systems   Constitutional:  Negative for fever.   HENT:  Negative for sore throat.    Eyes:  Negative for visual disturbance.   Respiratory:  Negative for shortness of breath.    Cardiovascular:  Negative for chest pain.   Gastrointestinal:  Negative for abdominal pain.        PEG tube out   Genitourinary:  Negative for dysuria.   Musculoskeletal:  Negative for joint swelling.   Skin:  Negative for rash.   Neurological:  Negative for weakness.   Psychiatric/Behavioral:  Negative for confusion.        Physical Exam     Initial Vitals [09/26/23 0300]   BP Pulse Resp Temp SpO2   125/83 95 18 97.5 °F (36.4 °C) 98 %      MAP       --         Physical Exam    Nursing note and vitals reviewed.  Constitutional: She appears well-developed and well-nourished.   HENT:   Head: " "Normocephalic and atraumatic.   Eyes: EOM are normal. Pupils are equal, round, and reactive to light.   Neck:   Patient with tracheostomy.  Currently on trach collar with some supplemental oxygen.   Normal range of motion.  Cardiovascular:  Regular rhythm, normal heart sounds and intact distal pulses.           No murmur heard.  Tachycardic   Pulmonary/Chest: Breath sounds normal. No respiratory distress. She has no wheezes. She has no rales.   Abdominal: Abdomen is soft. She exhibits no distension. There is no abdominal tenderness.   PEG tube ostomy site to left upper quadrant with no PEG tube. There is no rebound.   Genitourinary:    Genitourinary Comments: Indwelling Wagoner catheter     Musculoskeletal:         General: No tenderness or edema.      Cervical back: Normal range of motion.     Neurological: She is alert. No cranial nerve deficit.   Patient answering questions.  Following commands.   Skin: Skin is warm and dry. Capillary refill takes less than 2 seconds. No rash noted. No erythema.         ED Course   Feeding Tube    Date/Time: 9/26/2023 3:32 AM  Location procedure was performed: [facility] EMERGENCY DEPARTMENT    Performed by: Abilio Hall MD  Authorized by: Abilio Hall MD  Consent: Verbal consent obtained.  Risks and benefits: risks, benefits and alternatives were discussed  Consent given by: patient  Patient understanding: patient states understanding of the procedure being performed  Patient consent: the patient's understanding of the procedure matches consent given  Procedure consent: procedure consent matches procedure scheduled  Required items: required blood products, implants, devices, and special equipment available  Patient identity confirmed: verbally with patient  Time out: Immediately prior to procedure a "time out" was called to verify the correct patient, procedure, equipment, support staff and site/side marked as required.  Indications: tube dislodged    Sedation:  Patient sedated: " no    Local anesthesia used: no    Anesthesia:  Local anesthesia used: no  Tube type: gastrostomy  Patient position: supine  Procedure type: replacement  Tube size: 24 Fr  Endoscope used: no  Comments: Procedure unsuccessful.        Labs Reviewed   COMPREHENSIVE METABOLIC PANEL - Abnormal; Notable for the following components:       Result Value    Carbon Dioxide 20 (*)     Glucose Level 139 (*)     Blood Urea Nitrogen 39.7 (*)     Creatinine 1.07 (*)     Protein Total 7.9 (*)     Albumin Level 3.3 (*)     Globulin 4.6 (*)     Albumin/Globulin Ratio 0.7 (*)     All other components within normal limits   CBC WITH DIFFERENTIAL - Abnormal; Notable for the following components:    MCH 25.7 (*)     MCHC 31.9 (*)     MPV 12.3 (*)     All other components within normal limits   CBC W/ AUTO DIFFERENTIAL    Narrative:     The following orders were created for panel order CBC auto differential.  Procedure                               Abnormality         Status                     ---------                               -----------         ------                     CBC with Differential[[id number]]       Abnormal            Final result                 Please view results for these tests on the individual orders.          Imaging Results    None          Medications   lactated ringers bolus 1,000 mL (1,000 mLs Intravenous New Bag 9/26/23 0512)     Medical Decision Making  Problems Addressed:  PEG tube malfunction: acute illness or injury that poses a threat to life or bodily functions    Amount and/or Complexity of Data Reviewed  Labs: ordered.  Radiology: ordered.    Risk  Decision regarding hospitalization.            Scribe Attestation:   Scribe #1: I performed the above scribed service and the documentation accurately describes the services I performed. I attest to the accuracy of the note.    Attending Attestation:           Physician Attestation for Scribe:  Physician Attestation Statement for Scribe #1: Rafael BA  Abilio WAHL MD, reviewed documentation, as scribed by Aly Chavez in my presence, and it is both accurate and complete.                        Medical Decision Making:   History:   I obtained history from: someone other than patient and EMS provider.       <> Summary of History: Collateral from EMS  Old Medical Records: I decided to obtain old medical records.  Old Records Summarized: records from another hospital, records from clinic visits and records from previous admission(s).       <> Summary of Records: Reviewed old records, feeding tube placed in January of this year.  Initial Assessment:   Feeding tube dislodged  Differential Diagnosis:   Feeding tube malfunction, feeding tube dislodged  Clinical Tests:   Lab Tests: Ordered and Reviewed  ED Management:    Patient is a 68-year-old female presents to the emergency department for feeding tube dislodged.  See HPI.  See physical exam.  Attempted replacement at the bedside, unsuccessful.  Discussed with General surgery who also attempted to replace at bedside which was successful.  Will likely have to take to OR for PEG tube replacement.  Discussed with hospital medicine for admission.  Other:   I have discussed this case with another health care provider.       <> Summary of the Discussion: Discussed with hospital medicine for admission.  Discussed with General surgery.      Clinical Impression:   Final diagnoses:  [K94.23] PEG tube malfunction        ED Disposition Condition    Admit Stable                Abilio Hall MD  09/26/23 0535       Abilio Hall MD  09/26/23 0536

## 2023-09-26 NOTE — CONSULTS
Acute Care Surgery   History and Physical Note    Patient Name: Jenny Melo  YOB: 1955  Date: 09/26/2023 4:33 AM  Date of Admission: 9/26/2023  HD#0  POD#* No surgery found *    PRESENTING HISTORY   Chief Complaint/Reason for Admission: <principal problem not specified>    History of Present Illness:  68y F w/pmhx of respiratory failure s/p Trach and Open G Tube, DNR/ Hospice, A fib, CVA, Schizoaffective  disorder, DM II, and CAD presenting for dislodged G tube. Patient previously had an open 26Fr G tube placed at [facility] in January who was found at 0200 with her G tube dislodged and in bed. She is TF dependent. Multiple replacement attempts were attempted at bedside and were unsuccessful.    Review of Systems:  12 point ROS negative except as stated in HPI    PAST HISTORY:   Past medical history:  CVA  A fib w/RVR  CVA  Schizoaffective Disorder  DM II  CAD      Past surgical history:  Trach and Open G Tube    Family history:  No family history on file.    Social history:  Social History     Socioeconomic History    Marital status: Other     Social History     Tobacco Use   Smoking Status Not on file   Smokeless Tobacco Not on file      Social History     Substance and Sexual Activity   Alcohol Use Not on file        MEDICATIONS & ALLERGIES:     No current facility-administered medications on file prior to encounter.     No current outpatient medications on file prior to encounter.       Allergies: Review of patient's allergies indicates:  Not on File    Scheduled Meds:    Continuous Infusions:    PRN Meds:    OBJECTIVE:   Vital Signs:  VITAL SIGNS: 24 HR MIN & MAX LAST   Temp  Min: 97.5 °F (36.4 °C)  Max: 97.5 °F (36.4 °C)  97.5 °F (36.4 °C)   BP  Min: 125/83  Max: 125/83  125/83    Pulse  Min: 95  Max: 95  95    Resp  Min: 18  Max: 18  18    SpO2  Min: 98 %  Max: 98 %  98 %      HT:    WT:    BMI:       Intake/output:  Intake/Output - Last 3 Shifts       None          No intake or output  "data in the 24 hours ending 09/26/23 0433      Physical Exam:  General: Well developed, well nourished, Chronically Ill appearing  HEENT: Normocephalic, atraumatic, PERRL  CV: RRR  Resp: NWOB via trach  GI:  Abdomen soft, non-tender, non-distended, G tube site in LUQ w/some circumferential erythema.  :  Deferred  MSK: No muscle atrophy, cyanosis, peripheral edema, moving all extremities spontaneously    Labs:  Troponin:  No results for input(s): "TROPONINI" in the last 72 hours.  CBC:  Recent Labs     09/26/23  0413   WBC 8.93   RBC 4.75   HGB 12.2   HCT 38.2      MCV 80.4   MCH 25.7*   MCHC 31.9*     CMP:  No results for input(s): "GLU", "CALCIUM", "ALBUMIN", "PROT", "NA", "K", "CO2", "CL", "BUN", "CREATININE", "ALKPHOS", "ALT", "AST", "BILITOT" in the last 72 hours.  Lactic Acid:  No results for input(s): "LACTATE" in the last 72 hours.  ETOH:  No results for input(s): "ETHANOL" in the last 72 hours.   Urine Drug Screen:  No results for input(s): "COCAINE", "OPIATE", "BARBITURATE", "AMPHETAMINE", "FENTANYL", "CANNABINOIDS", "MDMA" in the last 72 hours.    Invalid input(s): "BENZODIAZEPINE", "PHENCYCLIDINE"   ABG:  No results for input(s): "PH", "PCO2", "PO2", "HCO3", "BE", "POCSATURATED" in the last 72 hours.    Diagnostic Results:  XR Gastric tube check, non-radiologist performed    (Results Pending)       ASSESSMENT & PLAN:    68y F w/extensive medical comorbidities including respiratory failure s/p trach and Open G tube presenting for dislodged G tube.    - Bedside replacement failed with multiple tubes of decreasing sizes.  - Will require replacement in the OR. Will discuss with timing with daytime staff.  - Recommend admission to medicine given multiple medical comorbidities.      Gael Good MD  [employer] General Surgery, PGY-2    "

## 2023-09-26 NOTE — H&P
"[facility] Hospital Medicine History & Physical Examination       Patient Name: Jenny Melo  MRN: [id number]  Patient Class: IP- Inpatient   Admission Date: 9/26/2023   Admitting Physician: MICHEL Service   Length of Stay: 0  Attending Physician: Dr. Antoni Gaines  Primary Care Provider: [facility]  Face-to-Face encounter date: 09/26/2023  Code Status: DNR  Chief Complaint: PEG tube problem (PEG tube "fell out" yesterday at unknown time per nurse. From [facility] rehab at [facility] )        Patient information was obtained from patient, patient's family, past medical records and ER records.     HISTORY OF PRESENT ILLNESS:   Jenny Melo is a 68 y.o. female who PMH includes CVA, atrial fibrillation, respiratory failure status post trach, DM type 2, CAD, schizoaffective disorder, status post PEG tube placement; presents to the ED at [facility] on 9/26/2023 sent from [facility] rehab at [facility] after PEG tube was reportedly dislodged yesterday per nursing home staff.  It was reported per nursing home staff PEG tube fell out and has been dislodged for an unknown length of time.  In reviewing her medical records patient had an open gastrostomy tube placed January 2023 at [facility].  Patient is on chronic tube feedings.  Patient reports buttock pain on examination/rounds. Majority of the history is obtained from the medical records.  Lab work reviewed demonstrated  BUN 39.7, creatinine 1.07, glucose 139; other indices unremarkable.    Initial VS BP  125/83 pulse 95 respirations 18 temperature 97.5° F O2 saturation 98% on trach collar.  Multiple attempts were made to reinsert PEG tube at the bedside by ED and surgery Services which were unsuccessful.  Patient is admitted to hospital medicine services for further management.  Surgery Services consulted.    PAST MEDICAL HISTORY:   CVA   Atrial fibrillation   Respiratory failure status post tracheostomy placement   CAD "   Dm type 2   Schizoaffective disorder /bipolar disorder  Depression  Anxiety  Dementia   Asthma  HTN     PAST SURGICAL HISTORY:   Angiogram  [facility]  Cornoary stent placement  PEG Tube placement    ALLERGIES:   Patient has no allergy information on record.    FAMILY HISTORY:   Reviewed and negative    SOCIAL HISTORY:   No reports of tobacco use   No reports of illicit drug use   No reports of alcohol use  Resides in NH    HOME MEDICATIONS:   As documented:   See NH records    REVIEW OF SYSTEMS:   As per HPI except as documented    PHYSICAL EXAM:     VITAL SIGNS: 24 HRS MIN & MAX LAST   Temp  Min: 97.5 °F (36.4 °C)  Max: 97.5 °F (36.4 °C) 97.5 °F (36.4 °C)   BP  Min: 94/64  Max: 128/71 (!) 113/55   Pulse  Min: 80  Max: 95  93   Resp  Min: 18  Max: 22 (!) 22   SpO2  Min: 95 %  Max: 100 % 100 %       General appearance:  Chronically ill-appearing female looks older than stated age; nontoxic, fatigued, no family at bedside  HENT: Atraumatic head. dry mucous membranes of oral cavity.  Eyes: PERRL  Neck: trach in place with trach collar  Lungs: diminished bilaterally, coarse breath sounds, no wheezing present   Heart: Regular rate and rhythm. S1 and S2 present cap refill brisk  Abdomen: Soft, non-distended, non-tender. No rebound tenderness/guarding. Bowel sounds are normal, peg site with indwelling catheter to site  Extremities: No cyanosis, clubbing,generalized weakness  Skin: warm and dry, erythema and serosanguinous drainage from peg site  Neuro:oriented x 3; no acute focal deficits  Psych/mental status: flat affect, cooperative    LABS AND IMAGING:     Recent Labs   Lab 09/26/23  0413   WBC 8.93   RBC 4.75   HGB 12.2   HCT 38.2   MCV 80.4   MCH 25.7*   MCHC 31.9*   RDW 16.8      MPV 12.3*       Recent Labs   Lab 09/26/23  0413      K 4.0   CO2 20*   BUN 39.7*   CREATININE 1.07*   CALCIUM 10.0   ALBUMIN 3.3*   ALKPHOS 99   ALT 21   AST 25   BILITOT 0.6       Microbiology Results (last 7 days)       ** No  results found for the last 168 hours. **             XR Gastric tube check, non-radiologist performed  EXAMINATION  XR GASTRIC TUBE CHECK, NON-RADIOLOGIST PERFORMED    CLINICAL HISTORY  Confirm gtube placement. Please inject contrast and do tube study;    TECHNIQUE  A total of 1 AP image(s) submitted of the abdomen.    COMPARISON  None available at the time of initial interpretation.    FINDINGS  Lines/tubes/devices: Percutaneous gastric tube is identified, projecting over the left upper abdomen.  Contrast opacification of the catheter and immediately adjacent gastrointestinal tract is noted, without convincing evidence of extraluminal leakage.    A non-obstructed bowel gas pattern is present. No intra-abdominal mass effect is appreciated.  No obvious findings to suggest large volume pneumoperitoneum are appreciated, although detection of free intra-abdominal air is markedly degraded secondary to supine technique.  Included lower thoracic cavity and visualized osseous structures are without acute abnormality.    IMPRESSION  1. No evidence of percutaneous gastric tube leak.  2. No evidence of acute or focal intra-abdominal process. Additional details provided above.    Electronically signed by: Arnaldo Mcghee  Date:    09/26/2023  Time:    08:58        ASSESSMENT & PLAN:   ASSESSMENT:  Peg tube dysfunction- with dislodgement- POA  Dysphagia- with chronic tube feedings via peg tube- POA  DM type II with hyperglycemia- POA  HTN- essential- POA  CVA with residual chronic deficits- POA  Atrial fibrillation- racte controlled chronic- POA  CAD- s/p stent placement- POA  Chronic respiratory failure- s/p tracheostomy placement- POA  Weakness- POA    Hx of dementia, cystitis, bipolar disorder, schizoaffective disorder, depression, HLD    PLAN:  Consult surgery Services appreciate assistance and recommendations   Keep NPO status as there are tentative plans for surgical replacement of PEG tube   IV hydration  Accu-Cheks with  sliding scale  Monitor pulse oximetry   DuoNeb treatments q.4 hours as needed   Routine trach care   Resume home medication as deemed necessary once able to resume tube feedings   Repeat lab work in a.m.    VTE Prophylaxis: Lovneox for DVT prophylaxis and will be advised to be as mobile as possible     Patient condition:  Stable  __________________________________________________________________________  INPATIENT LIST OF MEDICATIONS     Scheduled Meds:see MAR  Continuous Infusions: see MAR  PRN Meds:.acetaminophen, naloxone, ondansetron, prochlorperazine, sodium chloride 0.9%      IJack FNP have reviewed and discussed the case with Dr. Antoni Gaines. Please see the following addendum for further assessment and plan from there attending ROCHELLE Marley   09/26/2023

## 2023-09-26 NOTE — ED NOTES
Spoke with Cassidy from [facility]. States she notices PEG tube out of place at 0200 9/26/2023. Pt had stated that it had been out of place since the morning.

## 2023-09-26 NOTE — NURSING
Nurses Note -- 4 Eyes      9/26/2023   5:48 PM      Skin assessed during: Admit      [] No Altered Skin Integrity Present    []Prevention Measures Documented      [x] Yes- Altered Skin Integrity Present or Discovered   [] LDA Added if Not in Epic (Describe Wound)   [x] New Altered Skin Integrity was Present on Admit and Documented in LDA   [] Wound Image Taken    Wound Care Consulted? No    Attending Nurse:  Alexsander Wan RN/Staff Member:  CHIO Cummings

## 2023-09-26 NOTE — PROGRESS NOTES
Acute Care Surgery   Progress Note  Admit Date: 9/26/2023  HD#0  POD#* No surgery found *    Subjective:   Interval history:  AFVSS  Wagoner was able to be placed within the prior tract  Gtube study done that was clear  20 Fr PEG was able to be placed in the same hole     Home Meds:No current outpatient medications   Scheduled Meds:   enoxparin  40 mg Subcutaneous Q24H (prophylaxis, 1700)     Continuous Infusions:   lactated ringers 75 mL/hr at 09/26/23 1115     PRN Meds:acetaminophen, dextrose 10%, dextrose 10%, glucagon (human recombinant), insulin aspart U-100, naloxone, ondansetron, prochlorperazine, sodium chloride 0.9%     Objective:     VITAL SIGNS: 24 HR MIN & MAX LAST   Temp  Min: 97.5 °F (36.4 °C)  Max: 97.5 °F (36.4 °C)  97.5 °F (36.4 °C)   BP  Min: 94/64  Max: 128/71  (!) 113/55    Pulse  Min: 80  Max: 95  93    Resp  Min: 18  Max: 22  (!) 22    SpO2  Min: 95 %  Max: 100 %  100 %      HT:    WT:    BMI:       Intake/output:  Intake/Output - Last 3 Shifts       None          No intake or output data in the 24 hours ending 09/26/23 1324        Lines/drains/airway:       Peripheral IV - Single Lumen 09/26/23 0510 22 G Anterior;Left Forearm (Active)   Site Assessment Clean;Dry;Intact 09/26/23 0515   Extremity Assessment Distal to IV No abnormal discoloration 09/26/23 0515   Line Status Infusing 09/26/23 0515   Dressing Status Clean;Dry;Intact 09/26/23 0515   Dressing Intervention First dressing 09/26/23 0515   Number of days: 0       Physical examination:  General: Well developed, well nourished, Chronically Ill appearing  HEENT: Normocephalic, atraumatic, PERRL  CV: RRR  Resp: NWOB via trach  GI:  Abdomen soft, non-tender, non-distended, G tube site in LUQ w/some circumferential erythema. Now with 20 FR G tube place in hole.   :  Deferred  MSK: No muscle atrophy, cyanosis, peripheral edema, moving all extremities spontaneously    Labs:  Renal:  Recent Labs     09/26/23  0413   BUN 39.7*   CREATININE  "1.07*     No results for input(s): "LACTIC" in the last 72 hours.  FENGI:  Recent Labs     09/26/23 0413      K 4.0   CO2 20*   CALCIUM 10.0   ALBUMIN 3.3*   BILITOT 0.6   AST 25   ALKPHOS 99   ALT 21     Heme:  Recent Labs     09/26/23 0413   HGB 12.2   HCT 38.2        ID:  Recent Labs     09/26/23 0413   WBC 8.93     CBG:  Recent Labs     09/26/23 0413   GLUCOSE 139*      Cardiovascular:  No results for input(s): "TROPONINI", "CKTOTAL", "CKMB", "BNP" in the last 168 hours.  ABG:  No results for input(s): "PH", "PO2", "PCO2", "HCO3", "BE" in the last 168 hours.   I have reviewed all pertinent lab results within the past 24 hours.    Imaging:  XR Gastric tube check, non-radiologist performed   Final Result      XR Gastric tube check, non-radiologist performed    (Results Pending)      I have reviewed all pertinent imaging results/findings within the past 24 hours.    Micro/Path/Other:  Microbiology Results (last 7 days)       ** No results found for the last 168 hours. **           Specimen (168h ago, onward)      None             Assessment & Plan:   68y F w/extensive medical comorbidities including respiratory failure s/p trach and Open G tube presenting for dislodged G tube.    -20 Fr gtube 8cc in balloon at 3cm at the skin was replaced into tract  -G-tube study repeated and is clear  -OK to use G-tube  -Dispo per ED  -Please call with any other questions or concerns    Rodolfo De La Cruz MD  General Surgery    "

## 2023-09-26 NOTE — Clinical Note
Diagnosis: PEG tube malfunction [233323]   Admitting Provider:: JOSHUA ANGELO [[id number]]   Future Attending Provider: JOSHUA ANGELO [[id number]]   Reason for IP Medical Treatment  (Clinical interventions that can only be accomplished in the IP setting? ) :: PEG tube malfunction, needs replacement   I certify that Inpatient services for greater than or equal to 2 midnights are medically necessary:: Yes   Plans for Post-Acute care--if anticipated (pick the single best option):: A. No post acute care anticipated at this time

## 2023-09-27 PROBLEM — E44.0 MODERATE MALNUTRITION: Status: ACTIVE | Noted: 2023-09-27

## 2023-09-27 PROBLEM — E44.0 MODERATE MALNUTRITION: Status: RESOLVED | Noted: 2023-09-27 | Resolved: 2023-09-27

## 2023-09-27 LAB
ALBUMIN SERPL-MCNC: 3 G/DL (ref 3.4–4.8)
ALBUMIN/GLOB SERPL: 0.8 RATIO (ref 1.1–2)
ALP SERPL-CCNC: 78 UNIT/L (ref 40–150)
ALT SERPL-CCNC: 22 UNIT/L (ref 0–55)
AST SERPL-CCNC: 22 UNIT/L (ref 5–34)
BASOPHILS # BLD AUTO: 0.04 X10(3)/MCL
BASOPHILS NFR BLD AUTO: 0.5 %
BILIRUB SERPL-MCNC: 0.5 MG/DL
BUN SERPL-MCNC: 47.9 MG/DL (ref 9.8–20.1)
CALCIUM SERPL-MCNC: 9.8 MG/DL (ref 8.4–10.2)
CHLORIDE SERPL-SCNC: 104 MMOL/L (ref 98–107)
CO2 SERPL-SCNC: 27 MMOL/L (ref 23–31)
CREAT SERPL-MCNC: 1.29 MG/DL (ref 0.55–1.02)
EOSINOPHIL # BLD AUTO: 0.35 X10(3)/MCL (ref 0–0.9)
EOSINOPHIL NFR BLD AUTO: 4.6 %
ERYTHROCYTE [DISTWIDTH] IN BLOOD BY AUTOMATED COUNT: 17.4 % (ref 11.5–17)
GFR SERPLBLD CREATININE-BSD FMLA CKD-EPI: 46 MLS/MIN/1.73/M2
GLOBULIN SER-MCNC: 4 GM/DL (ref 2.4–3.5)
GLUCOSE SERPL-MCNC: 108 MG/DL (ref 82–115)
HCT VFR BLD AUTO: 34.2 % (ref 37–47)
HGB BLD-MCNC: 10.6 G/DL (ref 12–16)
IMM GRANULOCYTES # BLD AUTO: 0.01 X10(3)/MCL (ref 0–0.04)
IMM GRANULOCYTES NFR BLD AUTO: 0.1 %
LYMPHOCYTES # BLD AUTO: 2.66 X10(3)/MCL (ref 0.6–4.6)
LYMPHOCYTES NFR BLD AUTO: 34.9 %
MCH RBC QN AUTO: 25.4 PG (ref 27–31)
MCHC RBC AUTO-ENTMCNC: 31 G/DL (ref 33–36)
MCV RBC AUTO: 82 FL (ref 80–94)
MONOCYTES # BLD AUTO: 0.5 X10(3)/MCL (ref 0.1–1.3)
MONOCYTES NFR BLD AUTO: 6.6 %
NEUTROPHILS # BLD AUTO: 4.06 X10(3)/MCL (ref 2.1–9.2)
NEUTROPHILS NFR BLD AUTO: 53.3 %
NRBC BLD AUTO-RTO: 0 %
PLATELET # BLD AUTO: 259 X10(3)/MCL (ref 130–400)
PMV BLD AUTO: 12.2 FL (ref 7.4–10.4)
POCT GLUCOSE: 101 MG/DL (ref 70–110)
POCT GLUCOSE: 104 MG/DL (ref 70–110)
POCT GLUCOSE: 85 MG/DL (ref 70–110)
POCT GLUCOSE: 91 MG/DL (ref 70–110)
POTASSIUM SERPL-SCNC: 3.8 MMOL/L (ref 3.5–5.1)
PROT SERPL-MCNC: 7 GM/DL (ref 5.8–7.6)
RBC # BLD AUTO: 4.17 X10(6)/MCL (ref 4.2–5.4)
SODIUM SERPL-SCNC: 144 MMOL/L (ref 136–145)
WBC # SPEC AUTO: 7.62 X10(3)/MCL (ref 4.5–11.5)

## 2023-09-27 PROCEDURE — 99900035 HC TECH TIME PER 15 MIN (STAT)

## 2023-09-27 PROCEDURE — 21400001 HC TELEMETRY ROOM

## 2023-09-27 PROCEDURE — 85025 COMPLETE CBC W/AUTO DIFF WBC: CPT | Performed by: INTERNAL MEDICINE

## 2023-09-27 PROCEDURE — 99900026 HC AIRWAY MAINTENANCE (STAT)

## 2023-09-27 PROCEDURE — 27000221 HC OXYGEN, UP TO 24 HOURS

## 2023-09-27 PROCEDURE — 80053 COMPREHEN METABOLIC PANEL: CPT | Performed by: INTERNAL MEDICINE

## 2023-09-27 PROCEDURE — 94761 N-INVAS EAR/PLS OXIMETRY MLT: CPT

## 2023-09-27 PROCEDURE — 25000003 PHARM REV CODE 250: Performed by: INTERNAL MEDICINE

## 2023-09-27 PROCEDURE — 63600175 PHARM REV CODE 636 W HCPCS: Performed by: NURSE PRACTITIONER

## 2023-09-27 RX ORDER — DIPHENHYDRAMINE HCL 25 MG
50 CAPSULE ORAL EVERY 6 HOURS PRN
Status: DISCONTINUED | OUTPATIENT
Start: 2023-09-27 | End: 2023-09-28 | Stop reason: HOSPADM

## 2023-09-27 RX ORDER — SODIUM CHLORIDE 9 MG/ML
INJECTION, SOLUTION INTRAVENOUS CONTINUOUS
Status: DISCONTINUED | OUTPATIENT
Start: 2023-09-27 | End: 2023-09-28

## 2023-09-27 RX ADMIN — SODIUM CHLORIDE: 9 INJECTION, SOLUTION INTRAVENOUS at 11:09

## 2023-09-27 RX ADMIN — ENOXAPARIN SODIUM 40 MG: 40 INJECTION SUBCUTANEOUS at 05:09

## 2023-09-27 NOTE — PROGRESS NOTES
"[facility] Hospital Medicine Progress Note        Chief Complaint: PEG tube problem (PEG tube "fell out" yesterday at unknown time per nurse. From [facility]hab at [facility] )         Patient information was obtained from patient, patient's family, past medical records and ER records.      HISTORY OF PRESENT ILLNESS:   Jenny Melo is a 68 y.o. female who PMH includes CVA, atrial fibrillation, respiratory failure status post trach, DM type 2, CAD, schizoaffective disorder, status post PEG tube placement; presents to the ED at [facility] on 9/26/2023 sent from [facility] at [facility] after PEG tube was reportedly dislodged yesterday per nursing home staff.  It was reported per nursing home staff PEG tube fell out and has been dislodged for an unknown length of time.  In reviewing her medical records patient had an open gastrostomy tube placed January 2023 at [facility].  Patient is on chronic tube feedings.  Patient reports buttock pain on examination/rounds. Majority of the history is obtained from the medical records.  Lab work reviewed demonstrated  BUN 39.7, creatinine 1.07, glucose 139; other indices unremarkable.    Initial VS BP  125/83 pulse 95 respirations 18 temperature 97.5° F O2 saturation 98% on trach collar.  Multiple attempts were made to reinsert PEG tube at the bedside by ED and surgery Services which were unsuccessful.  Patient is admitted to hospital medicine services for further management.  Surgery Services consulted.         Patient was admitted due to PEG tube dislodgement.  General surgery was consulted and has replaced the PEG tube.  On 09/26   Patient to resume tube feeds today.    Vitals are stable on trach collar   Creatinine levels of 1.29, slightly elevated   Hemoglobin levels of 10.6, down trending   Will continue IV normal saline at 100 cc/hour  Repeat labs in the morning if stable can be discharged back to the nursing home     Exam  General " appearance:  Chronically ill-appearing female looks older than stated age; nontoxic, fatigued, no family at bedside  HENT: Atraumatic head. dry mucous membranes of oral cavity.  Eyes: PERRL  Neck: trach in place with trach collar  Lungs: diminished bilaterally, coarse breath sounds, no wheezing present   Heart: Regular rate and rhythm. S1 and S2 present cap refill brisk  Abdomen: Soft, non-distended, non-tender. No rebound tenderness/guarding. Bowel sounds are normal, peg site with indwelling catheter to site  Extremities: No cyanosis, clubbing,generalized weakness  Skin: warm and dry, erythema and serosanguinous drainage from peg site  Neuro:oriented x 3; no acute focal deficits  Psych/mental status: flat affect, cooperative       ASSESSMENT:  Peg tube dysfunction- with dislodgement- POA s/p replaced on 9/26  Dysphagia- with chronic tube feedings via peg tube- POA  DM type II with hyperglycemia- POA  HTN- essential- POA  CVA with residual chronic deficits- POA  Atrial fibrillation- racte controlled chronic- POA  CAD- s/p stent placement- POA  Chronic respiratory failure- s/p tracheostomy placement- POA  Weakness- POA     Hx of dementia, cystitis, bipolar disorder, schizoaffective disorder, depression, HLD     PLAN:  Patient to resume tube feeds today   Vitals are stable on trach collar   Creatinine levels of 1.29, slightly elevated   Hemoglobin levels of 10.6, down trending   Will continue IV normal saline at 100 cc/hour  Repeat labs in the morning if hgb and cr are stable can be discharged back to the nursing home   Gen surgery have signed off   Accu-Cheks with sliding scale  Monitor pulse oximetry   DuoNeb treatments q.4 hours as needed   Routine trach care   Repeat lab work in a.m.       Dispo- Repeat labs in the morning if hgb and cr are stable can be discharged back to the nursing home       VTE Prophylaxis: Lovneox for DVT prophylaxis and will be advised to be as mobile as possible      Patient condition:   Stable  __________________________________________________________________________    VITAL SIGNS: 24 HRS MIN & MAX LAST   Temp  Min: 97.7 °F (36.5 °C)  Max: 99.1 °F (37.3 °C) 99 °F (37.2 °C)   BP  Min: 117/69  Max: 144/72 (!) 129/57   Pulse  Min: 76  Max: 96  76   Resp  Min: 18  Max: 20 18   SpO2  Min: 96 %  Max: 100 % 96 %     I have reviewed the following labs:  Recent Labs   Lab 09/26/23  0413 09/27/23  0404   WBC 8.93 7.62   RBC 4.75 4.17*   HGB 12.2 10.6*   HCT 38.2 34.2*   MCV 80.4 82.0   MCH 25.7* 25.4*   MCHC 31.9* 31.0*   RDW 16.8 17.4*    259   MPV 12.3* 12.2*     Recent Labs   Lab 09/26/23  0413 09/27/23  0404    144   K 4.0 3.8   CO2 20* 27   BUN 39.7* 47.9*   CREATININE 1.07* 1.29*   CALCIUM 10.0 9.8   ALBUMIN 3.3* 3.0*   ALKPHOS 99 78   ALT 21 22   AST 25 22   BILITOT 0.6 0.5     Microbiology Results (last 7 days)       ** No results found for the last 168 hours. **             See below for Radiology    Scheduled Med:   enoxparin  40 mg Subcutaneous Q24H (prophylaxis, 1700)      Continuous Infusions:   sodium chloride 0.9% 100 mL/hr at 09/27/23 1116      PRN Meds:  acetaminophen, dextrose 10%, dextrose 10%, diphenhydrAMINE, glucagon (human recombinant), insulin aspart U-100, naloxone, ondansetron, prochlorperazine, sodium chloride 0.9%     Assessment/Plan:      VTE prophylaxis:     Patient condition:  Stable/Fair/Guarded/ Serious/ Critical    Anticipated discharge and Disposition:         All diagnosis and differential diagnosis have been reviewed; assessment and plan has been documented; I have personally reviewed the labs and test results that are presently available; I have reviewed the patients medication list; I have reviewed the consulting providers response and recommendations. I have reviewed or attempted to review medical records based upon their availability    All of the patient's questions have been  addressed and answered. Patient's is agreeable to the above stated plan. I  will continue to monitor closely and make adjustments to medical management as needed.  _____________________________________________________________________    Nutrition Status:    Radiology:  I have personally reviewed the following imaging and agree with the radiologist.     XR Gastric tube check, non-radiologist performed  EXAMINATION  XR GASTRIC TUBE CHECK, NON-RADIOLOGIST PERFORMED    CLINICAL HISTORY  gi tube;    TECHNIQUE  A total of 1 AP image(s) submitted of the abdomen.    COMPARISON  26 September 2023, 08:19    FINDINGS  Lines/tubes/devices: Left upper abdominal gastrostomy tube is again appreciated, similar distribution of contrast confined to the gastric lumen and proximal duodenum.    No new focal abnormality of the included upper abdomen.    IMPRESSION  Similar appearance of gastrostomy tube.    Electronically signed by: Arnaldo Mcghee  Date:    09/26/2023  Time:    13:26  XR Gastric tube check, non-radiologist performed  EXAMINATION  XR GASTRIC TUBE CHECK, NON-RADIOLOGIST PERFORMED    CLINICAL HISTORY  Confirm gtube placement. Please inject contrast and do tube study;    TECHNIQUE  A total of 1 AP image(s) submitted of the abdomen.    COMPARISON  None available at the time of initial interpretation.    FINDINGS  Lines/tubes/devices: Percutaneous gastric tube is identified, projecting over the left upper abdomen.  Contrast opacification of the catheter and immediately adjacent gastrointestinal tract is noted, without convincing evidence of extraluminal leakage.    A non-obstructed bowel gas pattern is present. No intra-abdominal mass effect is appreciated.  No obvious findings to suggest large volume pneumoperitoneum are appreciated, although detection of free intra-abdominal air is markedly degraded secondary to supine technique.  Included lower thoracic cavity and visualized osseous structures are without acute abnormality.    IMPRESSION  1. No evidence of percutaneous gastric tube leak.  2. No  evidence of acute or focal intra-abdominal process. Additional details provided above.    Electronically signed by: Arnaldo Mcghee  Date:    09/26/2023  Time:    08:58      Heath Holbrook MD   09/27/2023

## 2023-09-27 NOTE — CONSULTS
Inpatient Nutrition Assessment    Admit Date: 9/26/2023   Total duration of encounter: 1 day     Nutrition Recommendation/Prescription     Once medically feasible, begin Tube feeds: Diabetisource AC at 25mL/hr, advance by 10mL q 4hrs as tolerated to goal rate 75mL/hr. Free water flushes 130mL q 3hrs.     Total Nutrition Provided by Tube Feeding, Additives, and Flushes:  Calories Provided  1800 kcal/d, 100% needs   Protein Provided  90 g/d, 98% needs   Fluid Provided  2270 ml/d, 98% needs   Continuous feeding calculations based on estimated 20 hr/d run time unless otherwise stated.     Communication of Recommendations: reviewed with nurse    Nutrition Assessment     Malnutrition Assessment/Nutrition-Focused Physical Exam    Malnutrition Context: acute illness or injury (09/27/23 1222)  Malnutrition Level: moderate (09/27/23 1240)  Energy Intake (Malnutrition): other (see comments) (does not meet criteria) (09/27/23 1222)  Weight Loss (Malnutrition): other (see comments) (unable to complete at this time) (09/27/23 1222)     Orbital Region (Subcutaneous Fat Loss): mild depletion           [place] Region (Muscle Loss): mild depletion  Clavicle Bone Region (Muscle Loss): mild depletion                             A minimum of two characteristics is recommended for diagnosis of either severe or non-severe malnutrition.    Chart Review    Reason Seen: continuous nutrition monitoring, malnutrition screening tool (MST), and physician consult for Peg feeds    Malnutrition Screening Tool Results   Have you recently lost weight without trying?: Unsure  Have you been eating poorly because of a decreased appetite?: No   MST Score: 2   Diagnosis:  Peg tube dysfunction- with dislodgement- POA  Dysphagia- with chronic tube feedings via peg tube- POA  DM type II with hyperglycemia- POA  HTN- essential- POA  CVA with residual chronic deficits- POA  Atrial fibrillation- racte controlled chronic- POA  CAD- s/p stent placement-  "POA  Chronic respiratory failure- s/p tracheostomy placement- POA  Weakness- POA    Relevant Medical History: CVA   Atrial fibrillation   Respiratory failure status post tracheostomy placement   CAD   Dm type 2   Schizoaffective disorder /bipolar disorder  Depression  Anxiety  Dementia   Asthma  HTN   Angiogram  LHC  [place] stent placement  PEG Tube placement      Nutrition-Related Medications: enoxaparin  Calorie Containing IV Medications: no significant kcals from medications at this time    Nutrition-Related Labs:  9/27: H/H-10.6/34.2, Bun-47.9, Crea-1.29    Diet/PN Order: No diet orders on file  Oral Supplement Order: none  Tube Feeding Order: none  Appetite/Oral Intake: NPO/not applicable  Factors Affecting Nutritional Intake: none identified  Food/Mosque/Cultural Preferences: none reported  Food Allergies: no known food allergies    Wound(s):   dermatitis noted in several areas    Last Bowel Movement: 09/26/23    Comments    9/27: Pt's peg fell out. Tube replaced. Consulted for Peg feeds. Per pt record, pt on Jevity and Glucerna. Will begin with Diabetisource AC while pt is here and monitor blood sugars.     Anthropometrics    Height: 5' 7.01" (170.2 cm)    Last Weight: 92.1 kg (203 lb 0.7 oz) (09/27/23 0600) Weight Method: Bed Scale  BMI (Calculated): 31.8  BMI Classification: obese grade I (BMI 30-34.9)     Ideal Body Weight (IBW), Female: 135.05 lb     % Ideal Body Weight, Female (lb): 150.67 %                             Usual Weight Provided By: EMR weight history    Wt Readings from Last 5 Encounters:   09/27/23 92.1 kg (203 lb 0.7 oz)     Weight Change(s) Since Admission:  Admit Weight: 92.3 kg (203 lb 6.4 oz) (09/26/23 1801)  Unsure of UBW-pt unable to answer questions and no family in room    Estimated Needs    Weight Used For Calorie Calculations: 92.1 kg (203 lb 0.7 oz)  Energy Calorie Requirements (kcal): 1785 kcal (MSJxSF 1.2)     Weight Used For Protein Calculations: 92.1 kg (203 lb 0.7 " oz)  Protein Requirements: 92gm (1.0gm/kg)  Fluid Requirements (mL): 2300 mL (25mL/kg)  Temp (24hrs), Av.8 °F (37.1 °C), Min:97.7 °F (36.5 °C), Max:99.1 °F (37.3 °C)       Enteral Nutrition    Patient not receiving enteral nutrition at this time.    Parenteral Nutrition    Patient not receiving parenteral nutrition support at this time.    Evaluation of Received Nutrient Intake    Calories: not meeting estimated needs  Protein: not meeting estimated needs    Patient Education    Not applicable.    Nutrition Diagnosis     PES: Inadequate enteral nutrition infusion related to mechanical cause as evidenced by NPO/peg replaced. (new)    Interventions/Goals     Intervention(s): modified composition of enteral nutrition, modified rate of enteral nutrition, and collaboration with other providers  Goal: Tolerate enteral feeding at goal rate by follow-up. (new)    Monitoring & Evaluation     Dietitian will monitor enteral nutrition intake.  Nutrition Risk/Follow-Up: low (follow-up in 5-7 days)   Please consult if re-assessment needed sooner.

## 2023-09-27 NOTE — PLAN OF CARE
09/27/23 1228   Discharge Assessment   Assessment Type Discharge Planning Assessment   Confirmed/corrected address, phone number and insurance Yes   Confirmed Demographics Correct on Facesheet   Source of Information patient   Communicated YANDEL with patient/caregiver Date not available/Unable to determine   Reason For Admission chest pain, peg tube malfunction   People in Home facility resident   Facility Arrived From: [facility]   Do you expect to return to your current living situation? Yes   Walking or Climbing Stairs transferring difficulty, dependent;stair climbing difficulty, dependent;ambulation difficulty, dependent   Dressing/Bathing bathing difficulty, dependent;dressing difficulty, dependent   Equipment Currently Used at Home oxygen   Readmission within 30 days? No   Patient currently being followed by outpatient case management? No   Do you have prescription coverage? Yes   Coverage Medicare   Do you have any problems affording any of your prescribed medications? No   Is the patient taking medications as prescribed? yes   Who is going to help you get home at discharge? [facility]   How do you get to doctors appointments? other (see comments)   Are you on dialysis? No   Do you take coumadin? No   DME Needed Upon Discharge  none   Discharge Plan discussed with: Patient   Transition of Care Barriers None   Discharge Plan A Return to nursing home   Discharge Plan B Skilled Nursing Facility

## 2023-09-27 NOTE — PLAN OF CARE
Problem: Skin Injury Risk Increased  Goal: Skin Health and Integrity  Outcome: Ongoing, Progressing     Problem: Adult Inpatient Plan of Care  Goal: Plan of Care Review  Outcome: Ongoing, Progressing  Goal: Patient-Specific Goal (Individualized)  Outcome: Ongoing, Progressing  Goal: Absence of Hospital-Acquired Illness or Injury  Outcome: Ongoing, Progressing  Goal: Optimal Comfort and Wellbeing  Outcome: Ongoing, Progressing  Goal: Readiness for Transition of Care  Outcome: Ongoing, Progressing     Problem: Impaired Wound Healing  Goal: Optimal Wound Healing  Outcome: Ongoing, Progressing

## 2023-09-27 NOTE — PLAN OF CARE
Patient resides at [facility][note 3] and will return there at discharge

## 2023-09-28 VITALS
RESPIRATION RATE: 18 BRPM | SYSTOLIC BLOOD PRESSURE: 159 MMHG | WEIGHT: 198.44 LBS | HEART RATE: 88 BPM | BODY MASS INDEX: 31.15 KG/M2 | TEMPERATURE: 100 F | DIASTOLIC BLOOD PRESSURE: 78 MMHG | HEIGHT: 67 IN | OXYGEN SATURATION: 97 %

## 2023-09-28 LAB
ANION GAP SERPL CALC-SCNC: 13 MEQ/L
BASOPHILS # BLD AUTO: 0.04 X10(3)/MCL
BASOPHILS NFR BLD AUTO: 0.6 %
BUN SERPL-MCNC: 41 MG/DL (ref 9.8–20.1)
CALCIUM SERPL-MCNC: 9.1 MG/DL (ref 8.4–10.2)
CHLORIDE SERPL-SCNC: 110 MMOL/L (ref 98–107)
CO2 SERPL-SCNC: 22 MMOL/L (ref 23–31)
CREAT SERPL-MCNC: 0.89 MG/DL (ref 0.55–1.02)
CREAT/UREA NIT SERPL: 46
EOSINOPHIL # BLD AUTO: 0.39 X10(3)/MCL (ref 0–0.9)
EOSINOPHIL NFR BLD AUTO: 5.7 %
ERYTHROCYTE [DISTWIDTH] IN BLOOD BY AUTOMATED COUNT: 17.2 % (ref 11.5–17)
GFR SERPLBLD CREATININE-BSD FMLA CKD-EPI: >60 MLS/MIN/1.73/M2
GLUCOSE SERPL-MCNC: 112 MG/DL (ref 82–115)
HCT VFR BLD AUTO: 33.7 % (ref 37–47)
HGB BLD-MCNC: 10.7 G/DL (ref 12–16)
IMM GRANULOCYTES # BLD AUTO: 0.01 X10(3)/MCL (ref 0–0.04)
IMM GRANULOCYTES NFR BLD AUTO: 0.1 %
LYMPHOCYTES # BLD AUTO: 2.02 X10(3)/MCL (ref 0.6–4.6)
LYMPHOCYTES NFR BLD AUTO: 29.3 %
MCH RBC QN AUTO: 26 PG (ref 27–31)
MCHC RBC AUTO-ENTMCNC: 31.8 G/DL (ref 33–36)
MCV RBC AUTO: 81.8 FL (ref 80–94)
MONOCYTES # BLD AUTO: 0.44 X10(3)/MCL (ref 0.1–1.3)
MONOCYTES NFR BLD AUTO: 6.4 %
NEUTROPHILS # BLD AUTO: 3.99 X10(3)/MCL (ref 2.1–9.2)
NEUTROPHILS NFR BLD AUTO: 57.9 %
NRBC BLD AUTO-RTO: 0 %
PLATELET # BLD AUTO: 285 X10(3)/MCL (ref 130–400)
PMV BLD AUTO: 13 FL (ref 7.4–10.4)
POCT GLUCOSE: 110 MG/DL (ref 70–110)
POTASSIUM SERPL-SCNC: 3.4 MMOL/L (ref 3.5–5.1)
RBC # BLD AUTO: 4.12 X10(6)/MCL (ref 4.2–5.4)
SODIUM SERPL-SCNC: 145 MMOL/L (ref 136–145)
WBC # SPEC AUTO: 6.89 X10(3)/MCL (ref 4.5–11.5)

## 2023-09-28 PROCEDURE — 25000003 PHARM REV CODE 250: Performed by: INTERNAL MEDICINE

## 2023-09-28 PROCEDURE — 99900022

## 2023-09-28 PROCEDURE — 99900035 HC TECH TIME PER 15 MIN (STAT)

## 2023-09-28 PROCEDURE — 99900026 HC AIRWAY MAINTENANCE (STAT)

## 2023-09-28 PROCEDURE — 94761 N-INVAS EAR/PLS OXIMETRY MLT: CPT

## 2023-09-28 PROCEDURE — 85025 COMPLETE CBC W/AUTO DIFF WBC: CPT | Performed by: INTERNAL MEDICINE

## 2023-09-28 PROCEDURE — 80048 BASIC METABOLIC PNL TOTAL CA: CPT | Performed by: INTERNAL MEDICINE

## 2023-09-28 PROCEDURE — 27000221 HC OXYGEN, UP TO 24 HOURS

## 2023-09-28 PROCEDURE — 63600175 PHARM REV CODE 636 W HCPCS: Performed by: NURSE PRACTITIONER

## 2023-09-28 RX ORDER — HYDRALAZINE HYDROCHLORIDE 20 MG/ML
10 INJECTION INTRAMUSCULAR; INTRAVENOUS EVERY 8 HOURS PRN
Status: DISCONTINUED | OUTPATIENT
Start: 2023-09-28 | End: 2023-09-28 | Stop reason: HOSPADM

## 2023-09-28 RX ORDER — AMOXICILLIN AND CLAVULANATE POTASSIUM 250; 62.5 MG/5ML; MG/5ML
500 POWDER, FOR SUSPENSION ORAL EVERY 8 HOURS
Qty: 210 ML | Refills: 0
Start: 2023-09-28 | End: 2023-10-05

## 2023-09-28 RX ORDER — AMOXICILLIN AND CLAVULANATE POTASSIUM 250; 62.5 MG/5ML; MG/5ML
500 POWDER, FOR SUSPENSION ORAL EVERY 8 HOURS
Status: DISCONTINUED | OUTPATIENT
Start: 2023-09-28 | End: 2023-09-28 | Stop reason: HOSPADM

## 2023-09-28 RX ADMIN — HYDRALAZINE HYDROCHLORIDE 10 MG: 20 INJECTION INTRAMUSCULAR; INTRAVENOUS at 01:09

## 2023-09-28 RX ADMIN — AMOXICILLIN AND CLAVULANATE POTASSIUM 500 MG: 250; 62.5 POWDER, FOR SUSPENSION ORAL at 01:09

## 2023-09-28 RX ADMIN — POTASSIUM BICARBONATE 25 MEQ: 977.5 TABLET, EFFERVESCENT ORAL at 10:09

## 2023-09-28 NOTE — PLAN OF CARE
Clinicals sent via [facility] to Chelsea Acevedo notified them of possible discharge today

## 2023-09-28 NOTE — DISCHARGE SUMMARY
[facility] - 5th Floor [facility] MEDICINE - DISCHARGE SUMMARY    Patient Name: Jenny Melo  MRN: [id number]  Admission Date: 9/26/2023  Discharge Date: 09/28/2023  Hospital Length of Stay: 2 days  Discharge Provider: Juan Hall MD  Primary Care Provider: Beatriz, Primary Doctor      HOSPITAL COURSE   Jenny Melo is a 68 y.o. female who PMH includes CVA, atrial fibrillation, respiratory failure status post trach, DM type 2, CAD, schizoaffective disorder, status post PEG tube placement; presents to the ED at [facility] on 9/26/2023 sent from [facility] at [facility] after PEG tube was reportedly dislodged yesterday per nursing home staff.  It was reported per nursing home staff PEG tube fell out and has been dislodged for an unknown length of time.  In reviewing her medical records patient had an open gastrostomy tube placed January 2023 at [facility].  Patient is on chronic tube feedings.  Patient reports buttock pain on examination/rounds. Majority of the history is obtained from the medical records.  Lab work reviewed demonstrated  BUN 39.7, creatinine 1.07, glucose 139; other indices unremarkable.    Initial VS BP  125/83 pulse 95 respirations 18 temperature 97.5° F O2 saturation 98% on trach collar.  Multiple attempts were made to reinsert PEG tube at the bedside by ED and surgery Services which were unsuccessful.  Patient is admitted to hospital medicine services for further management.  Surgery Services consulted.  This was placed September 26th and tube feeds resumed.  Also at the site of the PEG tube there appeared to be slight purulence saw have put her on Augmentin 500 t.i.d. x7 days.  Otherwise no new issues plan to discharge back to the nursing facility.        PHYSICAL EXAM     Most Recent Vital Signs:  Temp: 99.5 °F (37.5 °C) (09/28/23 1100)  Pulse: 88 (09/28/23 1100)  Resp: 18 (09/28/23 1100)  BP: (!) 159/78 (09/28/23 1100)  SpO2: 97 % (09/28/23 1100)   GENERAL: In no acute  distress, afebrile  HEENT:  Tracheotomy  CHEST: Clear to auscultation bilaterally  HEART: S1, S2, no appreciable murmur  ABDOMEN: Soft, nontender, BS +, slight purulence at the PEG tube site  MSK: Warm, no lower extremity edema, no clubbing or cyanosis  NEUROLOGIC: Alert and oriented x4, moving all extremities with good strength   INTEGUMENTARY:  PSYCHIATRY:          DISCHARGE DIAGNOSIS   Dislodged PEG tube-status post replace   Peg tube site purulence    History of: Dm 2, HTN, CVA, AFib, CAD stents, chronic respiratory failure tracheostomy, dementia, bipolar disorder, schizoaffective disorder, depression        _____________________________________________________________________________      DISCHARGE MED REC     Current Discharge Medication List        START taking these medications    Details   amoxicillin-pot clavulanate 250-62.5 mg/5ml (AUGMENTIN) 250-62.5 mg/5 mL suspension Take 10 mLs (500 mg total) by mouth every 8 (eight) hours. for 7 days  Qty: 210 mL, Refills: 0                CONSULTS     Consults (From admission, onward)          Status Ordering Provider     Inpatient consult to Registered Dietitian/Nutritionist  Once        Provider:  (Not yet assigned)    Completed JOSHUA ANGELO              FOLLOW UP      Follow-up Information       No, Primary Doctor Follow up in 1 week(s).                                 DISCHARGE INSTRUCTIONS     Explained in detail to the patient about the discharge plan, medications, and follow-up visits. Pt understands and agrees with the treatment plan.  Discharged Condition: stable  Diet as tolerated  Activities as tolerated  Discharge to: Nursing Facility    TIME SPENT ON DISCHARGE   35 minutes        Juan Hall MD  Internal Medicine  Department of Hospital Medicine [facility] - 5th Floor Med Surg      This document was created using electronic dictation services.  Please excuse any errors that may have been made.  Contact me if any questions regarding  documentation to clarify verbiage.

## 2023-09-28 NOTE — PLAN OF CARE
09/28/23 1012   Medicare Message   Important Message from Medicare regarding Discharge Appeal Rights Explained to patient/caregiver

## 2024-04-19 PROCEDURE — 87070 CULTURE OTHR SPECIMN AEROBIC: CPT | Performed by: FAMILY MEDICINE

## 2024-04-20 ENCOUNTER — LAB REQUISITION (OUTPATIENT)
Dept: LAB | Facility: HOSPITAL | Age: 69
End: 2024-04-20
Payer: MEDICARE

## 2024-04-20 DIAGNOSIS — N39.0 URINARY TRACT INFECTION, SITE NOT SPECIFIED: ICD-10-CM

## 2024-04-24 LAB
BACTERIA WND CULT: ABNORMAL
BACTERIA WND CULT: ABNORMAL

## 2024-06-20 ENCOUNTER — HOSPITAL ENCOUNTER (EMERGENCY)
Facility: HOSPITAL | Age: 69
End: 2024-06-20
Attending: STUDENT IN AN ORGANIZED HEALTH CARE EDUCATION/TRAINING PROGRAM
Payer: MEDICARE

## 2024-06-20 VITALS
DIASTOLIC BLOOD PRESSURE: 87 MMHG | OXYGEN SATURATION: 100 % | SYSTOLIC BLOOD PRESSURE: 171 MMHG | HEART RATE: 69 BPM | TEMPERATURE: 98 F | RESPIRATION RATE: 20 BRPM

## 2024-06-20 DIAGNOSIS — K94.23 PEG TUBE MALFUNCTION: Primary | ICD-10-CM

## 2024-06-20 PROCEDURE — 99284 EMERGENCY DEPT VISIT MOD MDM: CPT | Mod: 25

## 2024-06-20 PROCEDURE — 43762 RPLC GTUBE NO REVJ TRC: CPT

## 2024-06-20 NOTE — DISCHARGE INSTRUCTIONS
Thanks for letting us take care of you today!  It is our goal to give you courteous care and to keep you comfortable and informed, if you have any questions before you leave I will be happy to try and answer them.    Here is some advice after your visit:    Your visit in the emergency department is NOT definitive care - please follow-up with your primary care doctor and/or specialist within 1-2 days. Please return to the emergency department if you develop worsening symptoms including: fever, chills, chest pain, shortness of breath, weakness, numbness, tingling, nausea, vomiting, inability to eat, drink, or take your medication. Please return if you have any worsening in your condition or if you have any other concerns.    If you had radiology exams like an XRAY or CT in the emergency Department the interpreation on them may be preliminary - there may be less time sensitive findings on the reports please obtain these reports within 24 hours from the hospital or by using your out on your mobile phone to access records.  Bring these to your primary care doctor and/or specialist for further review of incidental findings.    Please review any LAB WORK from your visit today with your primary care physician.    Please Follow up with your primary care provider (PCP), if you do not have a PCP, the contact information for the [facility] family medicine clinic is provided, you may call to schedule an appointment to establish care.  You may also consider calling [phone] to schedule an appointment with an [facility] PCP.

## 2024-06-20 NOTE — ED PROVIDER NOTES
Encounter Date: 6/20/2024       History     Chief Complaint   Patient presents with    PEG Tube Replacement     Arrives a[facility] unit [facility] from [facility] - peg tube pulled out 20fr unk time     This is a 68-year-old female who presents via EMS for PEG tube dislodgement.  Medical history significant for schizoaffective, trach dependent, diabetes, CVA.  EMS reports nursing home he was unsure when her PEG tube became dislodged.  Normally uses a 20 [place]ian.  Has come in the emergency department for further evaluation management.        Review of patient's allergies indicates:   Allergen Reactions    Codeine     Penicillins      Past Medical History:   Diagnosis Date    Moderate malnutrition 9/27/2023    Patient meets ASPEN criteria for mild malnutrition of acute illness or injury per RD assessment as evidenced by: Energy Intake (Malnutrition): other (see comments) (does not meet criteria) Weight Loss (Malnutrition): other (see comments) (unable to complete at this time)           A minimum of two characteristics is recommended for diagnosis of either severe or non-severe malnutrition.  Ht Reading     No past surgical history on file.  No family history on file.     Review of Systems   Unable to perform ROS: Patient nonverbal       Physical Exam     Initial Vitals [06/20/24 0151]   BP Pulse Resp Temp SpO2   (!) 171/87 69 20 98 °F (36.7 °C) 100 %      MAP       --         Physical Exam    Nursing note and vitals reviewed.  Constitutional: She appears well-developed and well-nourished. She is not diaphoretic. No distress.   HENT:   Head: Normocephalic and atraumatic.   Right Ear: External ear normal.   Left Ear: External ear normal.   Nose: Nose normal.   Eyes: Conjunctivae and EOM are normal. Pupils are equal, round, and reactive to light. Right eye exhibits no discharge. Left eye exhibits no discharge.   Cardiovascular:  Normal rate, regular rhythm, normal heart sounds and intact distal pulses.     Exam reveals no gallop and no  friction rub.       No murmur heard.  Pulmonary/Chest: Breath sounds normal. No respiratory distress. She has no wheezes. She has no rhonchi. She has no rales. She exhibits no tenderness.   Tracheostomy in place.  Site clean and dry.   Abdominal: Abdomen is soft. Bowel sounds are normal. She exhibits no distension and no mass. There is no abdominal tenderness.   There is a on occupied PEG tube site in the epigastrium.  Clean and dry.  Some granulation tissue noted.  No discharge or bleeding. There is no rebound and no guarding.   Musculoskeletal:         General: No edema. Normal range of motion.     Neurological: She is alert.   Patient is averbal.  Following commands.   Skin: Skin is warm and dry. Capillary refill takes less than 2 seconds. No erythema. No pallor.         ED Course   Feeding Tube    Date/Time: 6/20/2024 2:32 AM  Location procedure was performed: [facility] EMERGENCY DEPARTMENT    Performed by: Joe Nunez MD  Authorized by: Joe Nunez MD  Patient identity confirmed: arm band and verbally with patient  Indications: tube dislodged  Tube type: gastrostomy  Patient position: supine  Procedure type: replacement  Tube size: 18 Fr  Endoscope used: no  Bulb inflation volume: 7 (ml)  Bulb inflation fluid: normal saline  Placement/position confirmation: x-ray and contrast  Tube placement difficulty: minimal  Complications: No  Patient tolerance: patient tolerated the procedure well with no immediate complications  Comments: Unable to place a 20 Persian.  Downsized to 18 Persian placed without difficulty.        Labs Reviewed - No data to display       Imaging Results              XR Gastric tube check, non-radiologist performed (In process)                      Medications - No data to display  Medical Decision Making  Differential diagnosis to include but not limited to PEG tube malfunction, PEG tube dislodgement    Patient was awake alert well-appearing.  NAD.  Nonverbal.  Tube replaced in EMS  pain.  Patient was tolerated the procedure well.  Plain film shows good placement with the opacification of stomach with contrast.  Believe at this time patient was suitable for discharge to NH.    Amount and/or Complexity of Data Reviewed  External Data Reviewed: notes.     Details: See ED course  Radiology: ordered and independent interpretation performed. Decision-making details documented in ED Course.    Risk  OTC drugs.  Prescription drug management.               ED Course as of 06/20/24 0249   Thu Jun 20, 2024   0202 Chart review reveals discharge summary from September 28, 2023.  Patient with a history of CVA, AFib, respiratory failure status post trach, type 2 diabetes, CAD, schizoaffective disorder, PEG tube.  Peg tube was reportedly dislodged 09/26/2023.  Unknown amount of time.  Originally placed January 2023.  Unable to replace an ED.  He was admitted for further evaluation management. [MM]   0211 XR Gastric tube check, non-radiologist performed  Opacification of stomach and proximal small bowel [MM]      ED Course User Index  [MM] Joe Nunez MD                           Clinical Impression:  Final diagnoses:  [K94.23] PEG tube malfunction (Primary)          ED Disposition Condition    Discharge Stable          ED Prescriptions    None       Follow-up Information       Follow up With Specialties Details Why Contact [facility], [facility]  Call   [address] [facility] [address] [phone]      [facility] - Emergency Dept Emergency Medicine  If symptoms worsen Count includes the [facility] [facility] [id number] [phone]             Joe Nunez MD  06/20/24 0241